# Patient Record
Sex: FEMALE | Race: WHITE | NOT HISPANIC OR LATINO | Employment: UNEMPLOYED | RURAL
[De-identification: names, ages, dates, MRNs, and addresses within clinical notes are randomized per-mention and may not be internally consistent; named-entity substitution may affect disease eponyms.]

---

## 2018-10-27 LAB
EXTERNAL ABO GROUPING: NORMAL
EXTERNAL RH FACTOR: POSITIVE

## 2021-01-25 ENCOUNTER — INITIAL PRENATAL (OUTPATIENT)
Dept: OBSTETRICS AND GYNECOLOGY | Facility: CLINIC | Age: 26
End: 2021-01-25

## 2021-01-25 VITALS
BODY MASS INDEX: 31.55 KG/M2 | SYSTOLIC BLOOD PRESSURE: 114 MMHG | DIASTOLIC BLOOD PRESSURE: 72 MMHG | HEIGHT: 69 IN | WEIGHT: 213 LBS

## 2021-01-25 DIAGNOSIS — Z36.89 ENCOUNTER FOR FETAL ANATOMIC SURVEY: ICD-10-CM

## 2021-01-25 DIAGNOSIS — O09.32 LATE PRENATAL CARE AFFECTING PREGNANCY IN SECOND TRIMESTER: ICD-10-CM

## 2021-01-25 DIAGNOSIS — O09.32 INITIAL OBSTETRIC VISIT IN SECOND TRIMESTER: Primary | ICD-10-CM

## 2021-01-25 DIAGNOSIS — Z3A.17 17 WEEKS GESTATION OF PREGNANCY: ICD-10-CM

## 2021-01-25 PROCEDURE — 99203 OFFICE O/P NEW LOW 30 MIN: CPT | Performed by: NURSE PRACTITIONER

## 2021-01-25 RX ORDER — PRENATAL VIT NO.126/IRON/FOLIC 28MG-0.8MG
TABLET ORAL DAILY
COMMUNITY

## 2021-01-25 NOTE — PROGRESS NOTES
[facility]  Obstetrics Visit    CHIEF COMPLAINT:  New prenatal visit    HISTORY OF PRESENT ILLNESS:  Hannah Buchanan is a 25 y.o. y/o  at 17w3d by LMP (Patient's last menstrual period was 2020 (exact date).).  This was a planned pregnancy and the patient is supported by her spuse Ramiro. Pt is M[employer]on[employer] and plans to deliver at the hospital.  Denies nausea with vomiting.  Denies breast tenderness.  She denies any vaginal bleeding.  She has started taking a prenatal vitamin.    REVIEW OF SYSTEMS  Review of Systems   Constitutional: Negative for chills, fatigue, fever, unexpected weight gain and unexpected weight loss.   Respiratory: Negative for shortness of breath.    Cardiovascular: Negative for chest pain and palpitations.   Gastrointestinal: Negative for abdominal pain, constipation, diarrhea, nausea and vomiting.   Genitourinary: Negative for breast discharge, breast lump, breast pain, difficulty urinating, dysuria, frequency, urinary incontinence, vaginal bleeding, vaginal discharge and vaginal pain.   Skin: Negative for rash.   Neurological: Negative for weakness and headache.   Psychiatric/Behavioral: Negative for sleep disturbance, depressed mood and stress.       PRENATAL RISK FACTORS  Pregnancy Problems (from 20 to present)     Problem Noted Resolved    Late prenatal care affecting pregnancy in second trimester 2021 by Kathy Rios APRN No    Overview Signed 2021  4:31 PM by Kathy Rios APRN [employer]    Declined prenatal labs and pap smear.   Previous OB records requested from [facility].  Hx of high blood pressure in previous pregnancies.               DATING CRITERIA:  LMP (2020) -- FOSTER 2021  1TUS-declined    OBSTETRIC HISTORY:  OB History    Para Term  AB Living   5 3 3   1 3   SAB TAB Ectopic Molar Multiple Live Births   1                # Outcome Date GA Lbr Jose R/2nd Weight Sex Delivery Anes PTL Lv   5 Current             4 Term 10/28/18   2807 g (6 lb 3 oz) F Vag-Spont None     3 Term 09/23/16   2722 g (6 lb) F Vag-Spont None     2 Term 05/15/15   2410 g (5 lb 5 oz) F Vag-Spont None     1 SAB              GYN HISTORY:  Denies h/o sexually transmitted infections/pelvic inflammatory disease  Denies h/o abnormal pap smears  Last pap smear:  Per patient, normal a few years ago in [place]  Last Completed Pap Smear       Status Date      PAP SMEAR No completions recorded        Denies h/o gynecologic surgeries, including biopsies of the cervix    PAST MEDICAL HISTORY:  Past Medical History:   Diagnosis Date   • Hypertension      PAST SURGICAL HISTORY:  History reviewed. No pertinent surgical history.  FAMILY HISTORY:  History reviewed. No pertinent family history.  SOCIAL HISTORY:  Social History     Socioeconomic History   • Marital status:      Spouse name: Not on file   • Number of children: Not on file   • Years of education: Not on file   • Highest education level: Not on file   Tobacco Use   • Smoking status: Never Smoker   • Smokeless tobacco: Never Used   Substance and Sexual Activity   • Alcohol use: Never     Frequency: Never   • Drug use: Never   • Sexual activity: Yes     Partners: Male     GENETIC SCREENING:  Age >36 yo as of FOSTER: No  Thalassemia: No  NTD: No  CHD: No  Down Syndrome/MR/Fragile X/Autism: No  Ashkenazi Religion with Arvin-Sachs, [place], familial dysautonomia: No  Sickle cell disease or trait: No  Hemophilia: No  Muscular dystrophy: No  Cystic fibrosis: No  Canadian's chorea: No  Birth defects: No  Genetic/chromosomal disorders: No    INFECTION HISTORY:  TB exposure: No  HSV: No  Illness since LMP: No  Prior GBS infected child: No  STIs: No    ALLERGIES:  No Known Allergies    MEDICATIONS:  Prior to Admission medications    Medication Sig Start Date End Date Taking? Authorizing Provider   prenatal vitamin (prenatal, CLASSIC, vitamin) tablet Take  by mouth Daily.   Yes Provider, MD Maegan  "      PHYSICAL EXAM:   /72   Ht 175.3 cm (69\")   Wt 96.6 kg (213 lb)   LMP 2020 (Exact Date)   BMI 31.45 kg/m²   General: Alert, healthy, no distress, well nourished and well developed.  Neurologic: Alert, oriented to person, place, and time.  Gait normal.  Cranial nerves II-XII grossly intact.  HEENT: Normocephalic, atraumatic.    Teeth: Normal hygiene.  Neck: Supple, no adenopathy, thyroid normal size, non-tender, without nodularity, trachea midline.  Breasts: No masses, skin dimpling, skin retraction, nipple discharge, or asymmetry bilaterally.  Lungs: Normal respiratory effort.  Clear to auscultation bilaterally.  No wheezes, rhonci, or rales.  Heart: Regular rate and rhythm.  No murmer, rub or gallop.  Abdomen: Soft, non-tender, non-distended,no masses, no hepatosplenomegaly, no hernia.  Skin: No rash, no lesions.  Extremities: No cyanosis, clubbing or edema.  PELVIC EXAM: Deferred    Bedside ultrasound performed by myself which shows the findings below: single IUP, appeals to be in 2nd trimester. Fetal movement and cardiac activity visualized. FHR in the 140s via doppler.     IMPRESSION:  Hannah Buchanan is a 25 y.o.  at 17w3d for a new prenatal visit.    PLAN:  1.  IUP at 17w3d  - Options counseling performed and patient desires continuation of pregnancy to term   - Prenatal labs DECLINED; will obtain previous OB and delivery records.   - Genetic testing, including cystic fibrosis, was discussed and patient declines  - Continue prenatal vitamins  - Weight gain counseling performed.   - [place]rav[place] BMI 18.5-24.9: Recommend 25-35 lb   - Return to clinic in 4 weeks for Anatomy US with PNG and return prenatal visit  - Reviewed COVID-19 visitation policy  - Reviewed COVID-19 precautions     Diagnosis Plan   1. Initial obstetric visit in second trimester     2. Encounter for fetal anatomic survey  US Ob 14 + Weeks Single or First Gestation   3. 17 weeks gestation of pregnancy     4. Late prenatal " care affecting pregnancy in second trimester       Kathy Edwards, APRN  1/25/2021  16:40 CST

## 2021-02-15 ENCOUNTER — TELEPHONE (OUTPATIENT)
Dept: OBSTETRICS AND GYNECOLOGY | Facility: CLINIC | Age: 26
End: 2021-02-15

## 2021-02-15 NOTE — TELEPHONE ENCOUNTER
The  Group ultrasound is closed today. Spoke with patient at this time patient is aware that her ultrasound is cancelled for today stated who she spoke with about her ultrasound told her they would give her a call with new date and time for her scan. Let her know our office is closed as well due to weather and to give us a call once she gets the date for her scan and we will get her scheduled with it.

## 2021-03-03 ENCOUNTER — ROUTINE PRENATAL (OUTPATIENT)
Dept: OBSTETRICS AND GYNECOLOGY | Facility: CLINIC | Age: 26
End: 2021-03-03

## 2021-03-03 VITALS — WEIGHT: 222 LBS | DIASTOLIC BLOOD PRESSURE: 70 MMHG | BODY MASS INDEX: 32.78 KG/M2 | SYSTOLIC BLOOD PRESSURE: 120 MMHG

## 2021-03-03 DIAGNOSIS — Z3A.22 22 WEEKS GESTATION OF PREGNANCY: ICD-10-CM

## 2021-03-03 DIAGNOSIS — Z87.59 HISTORY OF GESTATIONAL HYPERTENSION: ICD-10-CM

## 2021-03-03 DIAGNOSIS — O09.32 LATE PRENATAL CARE AFFECTING PREGNANCY IN SECOND TRIMESTER: Primary | ICD-10-CM

## 2021-03-03 DIAGNOSIS — Z36.89 ENCOUNTER FOR OTHER SPECIFIED ANTENATAL SCREENING: ICD-10-CM

## 2021-03-03 DIAGNOSIS — O09.892 HISTORY OF PRETERM DELIVERY, CURRENTLY PREGNANT IN SECOND TRIMESTER: ICD-10-CM

## 2021-03-03 PROCEDURE — 99213 OFFICE O/P EST LOW 20 MIN: CPT | Performed by: NURSE PRACTITIONER

## 2021-03-03 NOTE — PROGRESS NOTES
"CC: Prenatal visit; hx reviewed, no changes.     Hannah Buchanan is a 25 y.o.  at 22w5d.  Doing well.  Denies dysuria, abnormal vaginal d/c, headaches, heartburn, constipation, cramping, LOF, or VB.  Reports good FM.    /70   Wt 101 kg (222 lb)   LMP 2020 (Exact Date)   BMI 32.78 kg/m²   SVE: NA  Reviewed previous labs from [place]; they are from 2018.     Anatomy u/s preliminary report reviewed. Posterior placenta w/o previa, with normal insertion of a 3VC. EFW- 1lb 5 oz. Size c/w gestational age. Suboptimal images of the abdomen situs, right and left hand and right outflow tract. All other anatomy seen and noted to appear normal at this time. CL- 4.71cm. Recommended f/u in 4 weeks for suboptimal views; pt declined at this time as \"most everything was seen and was normal\".       Fetal Heart Rate: 149    Pregnancy Problems (from 21 to present)     Problem Noted Resolved    History of gestational hypertension 3/3/2021 by Angela Sanchez APRN No    History of  delivery, currently pregnant in second trimester 3/3/2021 by Angela Sanchez APRN No    Overview Signed 3/3/2021  3:29 PM by Angela Sanchez APRN     Delivery at 36 weeks 2/2 hypertension. Declined P17.         Late prenatal care affecting pregnancy in second trimester 2021 by Kathy Rios APRN No    Overview Addendum 2021 11:11 AM by Kathy Rios APRN     John    Declined prenatal labs and pap smear.   Previous OB records requested from [facility].  Hx of high blood pressure in previous pregnancies.    Per records, Preeclampsia with first pregnancy, delivered at 36 weeks.  Third pregnancy: Cord avulsion. D&E in 2018.  Pt's blood type is O positive.                A/P: Hannah Buchanan is a 25 y.o.  at 22w5d.  - RTC in 6 weeks per pt request  - Reviewed COVID-19 visitation policy  - Reviewed COVID-19 precautions     Diagnosis Plan   1. Late prenatal care affecting pregnancy in second trimester     2. " Encounter for other specified  screening  CBC (No Diff)    Glucose, Post 50 Gm Glucola   3. History of gestational hypertension     4. 22 weeks gestation of pregnancy     5. History of  delivery, currently pregnant in second trimester       Angela Sanchez, DELIA  3/3/2021  16:00 CST

## 2021-03-04 DIAGNOSIS — Z36.89 ENCOUNTER FOR FETAL ANATOMIC SURVEY: ICD-10-CM

## 2021-03-10 ENCOUNTER — TELEPHONE (OUTPATIENT)
Dept: OBSTETRICS AND GYNECOLOGY | Facility: CLINIC | Age: 26
End: 2021-03-10

## 2021-03-10 NOTE — TELEPHONE ENCOUNTER
Pt c/o headache with blurry vision. States that she checked her BP at home and it was 123/67. She is not having any pain otherwise and has not taken any OTC meds to help with headache. Advised her to rest, take tylenol and increase fluid intake today. If headache is not responding after 2 doses of tylenol she was advised to go to the [facility] ER in [place].

## 2021-04-14 ENCOUNTER — LAB (OUTPATIENT)
Dept: LAB | Facility: HOSPITAL | Age: 26
End: 2021-04-14

## 2021-04-14 ENCOUNTER — ROUTINE PRENATAL (OUTPATIENT)
Dept: OBSTETRICS AND GYNECOLOGY | Facility: CLINIC | Age: 26
End: 2021-04-14

## 2021-04-14 VITALS — SYSTOLIC BLOOD PRESSURE: 114 MMHG | DIASTOLIC BLOOD PRESSURE: 74 MMHG | WEIGHT: 232 LBS | BODY MASS INDEX: 34.26 KG/M2

## 2021-04-14 DIAGNOSIS — O26.893 HEARTBURN DURING PREGNANCY IN THIRD TRIMESTER: ICD-10-CM

## 2021-04-14 DIAGNOSIS — O09.33 LATE PRENATAL CARE AFFECTING PREGNANCY IN THIRD TRIMESTER: ICD-10-CM

## 2021-04-14 DIAGNOSIS — Z87.59 HISTORY OF GESTATIONAL HYPERTENSION: ICD-10-CM

## 2021-04-14 DIAGNOSIS — Z3A.28 28 WEEKS GESTATION OF PREGNANCY: ICD-10-CM

## 2021-04-14 DIAGNOSIS — Z36.89 ENCOUNTER FOR OTHER SPECIFIED ANTENATAL SCREENING: ICD-10-CM

## 2021-04-14 DIAGNOSIS — O09.893 HISTORY OF PRETERM DELIVERY, CURRENTLY PREGNANT IN THIRD TRIMESTER: Primary | ICD-10-CM

## 2021-04-14 DIAGNOSIS — O09.93 HIGH-RISK PREGNANCY IN THIRD TRIMESTER: ICD-10-CM

## 2021-04-14 DIAGNOSIS — R12 HEARTBURN DURING PREGNANCY IN THIRD TRIMESTER: ICD-10-CM

## 2021-04-14 LAB
DEPRECATED RDW RBC AUTO: 41.6 FL (ref 37–54)
ERYTHROCYTE [DISTWIDTH] IN BLOOD BY AUTOMATED COUNT: 13.1 % (ref 12.3–15.4)
GLUCOSE 1H P 100 G GLC PO SERPL-MCNC: 125 MG/DL (ref 60–140)
HCT VFR BLD AUTO: 37.2 % (ref 34–46.6)
HGB BLD-MCNC: 13.4 G/DL (ref 12–15.9)
MCH RBC QN AUTO: 31.7 PG (ref 26.6–33)
MCHC RBC AUTO-ENTMCNC: 36 G/DL (ref 31.5–35.7)
MCV RBC AUTO: 87.9 FL (ref 79–97)
PLATELET # BLD AUTO: 165 10*3/MM3 (ref 140–450)
PMV BLD AUTO: 11.3 FL (ref 6–12)
RBC # BLD AUTO: 4.23 10*6/MM3 (ref 3.77–5.28)
WBC # BLD AUTO: 9.82 10*3/MM3 (ref 3.4–10.8)

## 2021-04-14 PROCEDURE — 82950 GLUCOSE TEST: CPT

## 2021-04-14 PROCEDURE — 85027 COMPLETE CBC AUTOMATED: CPT

## 2021-04-14 PROCEDURE — 99213 OFFICE O/P EST LOW 20 MIN: CPT | Performed by: NURSE PRACTITIONER

## 2021-04-14 NOTE — PROGRESS NOTES
CC: Prenatal visit; hx reviewed, no changes.     Hannah Buchanan is a 25 y.o.  at 28w5d.  Doing well.  Denies N/V, dysuria, abnormal vaginal d/c, headaches, constipation, cramping, regular contractions, LOF, or VB.  Reports good FM. Having some heartburn but hasn't needed to take anything yet.    /74   Wt 105 kg (232 lb)   LMP 2020 (Exact Date)   BMI 34.26 kg/m²   SVE: NA  Declined Tdap today.  Fundal Height (cm): 28 cm  Fetal Heart Rate: 145    Pregnancy Problems (from 21 to present)     Problem Noted Resolved    High-risk pregnancy in third trimester 2021 by Angela Sanchez APRN No    Overview Signed 2021 10:15 AM by Angela Sanchez APRN     O pos/ Rubella NONimmune (Pt does not vaccinate) / GBS 36 weeks  Dating: LMP  Genetics: Declined  Tdap: Declined   Flu: Declined  Anatomy: WNL with a few subopts. Pt declined F/U for subopts.  1h Glucola: -   H&H/Plts:   No results found for: HGB, HCT, PLT  Breast/BC?         Heartburn during pregnancy in third trimester 2021 by Angela Sanchez APRN No    History of gestational hypertension 3/3/2021 by Angela Sanchez APRN No    History of  delivery, currently pregnant in third trimester 3/3/2021 by Angela Sanchez APRN No    Overview Signed 3/3/2021  3:29 PM by Angela Sanchez APRN     Delivery at 36 weeks 2/2 hypertension. Declined P17.         Late prenatal care affecting pregnancy in third trimester 2021 by Kathy Rios APRN No    Overview Addendum 2021 11:11 AM by Kathy Rios APRN [employer]    Declined prenatal labs and pap smear.   Previous OB records requested from [facility].  Hx of high blood pressure in previous pregnancies.    Per records, Preeclampsia with first pregnancy, delivered at 36 weeks.  Third pregnancy: Cord avulsion. D&E in 2018.  Pt's blood type is O positive.          Previous Version          A/P: Hannah Buchanan is a 25 y.o.  at 28w5d.  - RTC in 4 weeks  -  Reviewed COVID-19 visitation policy  - Reviewed COVID-19 precautions     Diagnosis Plan   1. History of  delivery, currently pregnant in third trimester     2. History of gestational hypertension  PreE s/s reviewed.   3. Late prenatal care affecting pregnancy in third trimester     4. High-risk pregnancy in third trimester     5. 28 weeks gestation of pregnancy     6. Heartburn during pregnancy in third trimester  Discussed trigger foods and OTC management options     Angela Sanchez, DELIA  2021  10:15 CDT

## 2021-05-12 ENCOUNTER — ROUTINE PRENATAL (OUTPATIENT)
Dept: OBSTETRICS AND GYNECOLOGY | Facility: CLINIC | Age: 26
End: 2021-05-12

## 2021-05-12 VITALS — WEIGHT: 238 LBS | BODY MASS INDEX: 35.15 KG/M2 | DIASTOLIC BLOOD PRESSURE: 72 MMHG | SYSTOLIC BLOOD PRESSURE: 118 MMHG

## 2021-05-12 DIAGNOSIS — O09.33 LATE PRENATAL CARE AFFECTING PREGNANCY IN THIRD TRIMESTER: ICD-10-CM

## 2021-05-12 DIAGNOSIS — O09.93 HIGH-RISK PREGNANCY IN THIRD TRIMESTER: Primary | ICD-10-CM

## 2021-05-12 DIAGNOSIS — Z87.59 HISTORY OF GESTATIONAL HYPERTENSION: ICD-10-CM

## 2021-05-12 DIAGNOSIS — R12 HEARTBURN DURING PREGNANCY IN THIRD TRIMESTER: ICD-10-CM

## 2021-05-12 DIAGNOSIS — O26.893 HEARTBURN DURING PREGNANCY IN THIRD TRIMESTER: ICD-10-CM

## 2021-05-12 DIAGNOSIS — O09.893 HISTORY OF PRETERM DELIVERY, CURRENTLY PREGNANT IN THIRD TRIMESTER: ICD-10-CM

## 2021-05-12 DIAGNOSIS — Z3A.32 32 WEEKS GESTATION OF PREGNANCY: ICD-10-CM

## 2021-05-12 PROCEDURE — 99213 OFFICE O/P EST LOW 20 MIN: CPT | Performed by: NURSE PRACTITIONER

## 2021-05-27 ENCOUNTER — ROUTINE PRENATAL (OUTPATIENT)
Dept: OBSTETRICS AND GYNECOLOGY | Facility: CLINIC | Age: 26
End: 2021-05-27

## 2021-05-27 VITALS — DIASTOLIC BLOOD PRESSURE: 68 MMHG | WEIGHT: 241 LBS | SYSTOLIC BLOOD PRESSURE: 114 MMHG | BODY MASS INDEX: 35.59 KG/M2

## 2021-05-27 DIAGNOSIS — O09.33 LATE PRENATAL CARE AFFECTING PREGNANCY IN THIRD TRIMESTER: ICD-10-CM

## 2021-05-27 DIAGNOSIS — Z3A.35 35 WEEKS GESTATION OF PREGNANCY: Primary | ICD-10-CM

## 2021-05-27 DIAGNOSIS — O09.93 HIGH-RISK PREGNANCY IN THIRD TRIMESTER: ICD-10-CM

## 2021-05-27 PROCEDURE — 99213 OFFICE O/P EST LOW 20 MIN: CPT | Performed by: NURSE PRACTITIONER

## 2021-05-27 NOTE — PROGRESS NOTES
CC: Prenatal visit    Hannah Buchanan is a 26 y.o.  at 34w6d.  Doing well.  Denies contractions, LOF, or VB.  Reports good FM. Feeling some pelvic pressure. Pt desires to keep appointments in [facility]; is okay she may not meet a physician before delivery. Discussed that [facility] is only available twice a month in [facility].     /68   Wt 109 kg (241 lb)   LMP 2020 (Exact Date)   BMI 35.59 kg/m²   SVE: Deferred  Fundal Height (cm): 35 cm  Fetal Heart Rate: 160   Vertex via Vscan    Pregnancy Problems (from 21 to present)     Problem Noted Resolved    High-risk pregnancy in third trimester 2021 by Angela Sanchez APRN No    Overview Signed 2021 10:15 AM by Angela Sanchez APRN     O pos/ Rubella NONimmune (Pt does not vaccinate) / GBS 36 weeks  Dating: LMP  Genetics: Declined  Tdap: Declined   Flu: Declined  Anatomy: WNL with a few subopts. Pt declined F/U for subopts.  1h Glucola: -   H&H/Plts:   No results found for: HGB, HCT, PLT  Breast/BC?         Heartburn during pregnancy in third trimester 2021 by Angela Sanchez APRN No    History of gestational hypertension 3/3/2021 by Angela Sanchez APRN No    History of  delivery, currently pregnant in third trimester 3/3/2021 by Angela Sanchez APRN No    Overview Signed 3/3/2021  3:29 PM by Angela Sanchez APRN     Delivery at 36 weeks 2/2 hypertension. Declined P17.         Late prenatal care affecting pregnancy in third trimester 2021 by Kathy Rios APRN No    Overview Addendum 2021 11:11 AM by Kathy Rios APRN     [place]    Declined prenatal labs and pap smear.   Previous OB records requested from [facility].  Hx of high blood pressure in previous pregnancies.    Per records, Preeclampsia with first pregnancy, delivered at 36 weeks.  Third pregnancy: Cord avulsion. D&E in 2018.  Pt's blood type is O positive.          Previous Version          A/P: Hannah Buchanan is a 26 y.o.   at 34w6d.  -  labor  Precautions and fetal kick count reviewed.  - GBS swab at next appointment; counseling provided.  - RTC in 1 weeks  - Reviewed COVID-19 visitation policy  - Reviewed COVID-19 precautions     Diagnosis Plan   1. 35 weeks gestation of pregnancy     2. Late prenatal care affecting pregnancy in third trimester     3. High-risk pregnancy in third trimester       Kathy Edwards, APRN  2021  11:44 CDT

## 2021-06-09 ENCOUNTER — ROUTINE PRENATAL (OUTPATIENT)
Dept: OBSTETRICS AND GYNECOLOGY | Facility: CLINIC | Age: 26
End: 2021-06-09

## 2021-06-09 VITALS — DIASTOLIC BLOOD PRESSURE: 70 MMHG | BODY MASS INDEX: 35.44 KG/M2 | WEIGHT: 240 LBS | SYSTOLIC BLOOD PRESSURE: 118 MMHG

## 2021-06-09 DIAGNOSIS — O09.893 HISTORY OF PRETERM DELIVERY, CURRENTLY PREGNANT IN THIRD TRIMESTER: ICD-10-CM

## 2021-06-09 DIAGNOSIS — Z3A.36 36 WEEKS GESTATION OF PREGNANCY: ICD-10-CM

## 2021-06-09 DIAGNOSIS — O09.33 LATE PRENATAL CARE AFFECTING PREGNANCY IN THIRD TRIMESTER: ICD-10-CM

## 2021-06-09 DIAGNOSIS — Z87.59 HISTORY OF GESTATIONAL HYPERTENSION: ICD-10-CM

## 2021-06-09 DIAGNOSIS — O09.93 HIGH-RISK PREGNANCY IN THIRD TRIMESTER: ICD-10-CM

## 2021-06-09 DIAGNOSIS — Z36.85 ENCOUNTER FOR ANTENATAL SCREENING FOR STREPTOCOCCUS B: Primary | ICD-10-CM

## 2021-06-09 PROCEDURE — 99213 OFFICE O/P EST LOW 20 MIN: CPT | Performed by: NURSE PRACTITIONER

## 2021-06-09 PROCEDURE — 87653 STREP B DNA AMP PROBE: CPT | Performed by: NURSE PRACTITIONER

## 2021-06-09 NOTE — PROGRESS NOTES
CC: Prenatal visit    Hannah Buchanan is a 26 y.o.  at 36w5d.  Doing well.  Denies contractions, LOF, or VB.  Reports good FM.    /70   Wt 109 kg (240 lb)   LMP 2020 (Exact Date)   BMI 35.44 kg/m²   SVE: cervix closed/thick/high; GBS obtained  Fundal Height (cm): 37 cm  Fetal Heart Rate: 130    Pregnancy Problems (from 20 to present)     Problem Noted Resolved    High-risk pregnancy in third trimester 2021 by Angela Sanchez APRN No    Overview Signed 2021 10:15 AM by Angela Sanchez APRN     O pos/ Rubella NONimmune (Pt does not vaccinate) / GBS 36 weeks  Dating: LMP  Genetics: Declined  Tdap: Declined   Flu: Declined  Anatomy: WNL with a few subopts. Pt declined F/U for subopts.  1h Glucola: -   H&H/Plts:   No results found for: HGB, HCT, PLT  Breast/BC?         Heartburn during pregnancy in third trimester 2021 by Angela Sanchez APRN No    History of gestational hypertension 3/3/2021 by Angela Sanchez APRN No    History of  delivery, currently pregnant in third trimester 3/3/2021 by Angela Sanchez APRN No    Overview Signed 3/3/2021  3:29 PM by Angela Sanchez APRN     Delivery at 36 weeks 2/2 hypertension. Declined P17.         Late prenatal care affecting pregnancy in third trimester 2021 by Kathy Rios APRN No    Overview Addendum 2021 11:11 AM by Kathy Rios APRN     John    Declined prenatal labs and pap smear.   Previous OB records requested from [facility].  Hx of high blood pressure in previous pregnancies.    Per records, Preeclampsia with first pregnancy, delivered at 36 weeks.  Third pregnancy: Cord avulsion. D&E in 2018.  Pt's blood type is O positive.          Previous Version          A/P: Hannah Buchanan is a 26 y.o.  at 36w5d.  - RTC in 1 week  - Reviewed COVID-19 visitation policy  - Reviewed COVID-19 precautions     Diagnosis Plan   1. Encounter for  screening for Streptococcus B  Group B Strep  (Molecular) - Swab, Vaginal/Rectum    Group B Strep (Molecular) - Swab, Vaginal/Rectum   2. History of  delivery, currently pregnant in third trimester     3. History of gestational hypertension  Blood pressure 118/70 today, no signs/symptoms of preeclampsia reported today   4. Late prenatal care affecting pregnancy in third trimester     5. 36 weeks gestation of pregnancy     6. High-risk pregnancy in third trimester       Angela Sanchez, DELIA  2021  11:57 CDT

## 2021-06-10 PROBLEM — B95.1 POSITIVE GBS TEST: Status: ACTIVE | Noted: 2021-06-10

## 2021-06-10 LAB — GROUP B STREP, DNA: POSITIVE

## 2021-06-30 ENCOUNTER — ROUTINE PRENATAL (OUTPATIENT)
Dept: OBSTETRICS AND GYNECOLOGY | Facility: CLINIC | Age: 26
End: 2021-06-30

## 2021-06-30 VITALS — DIASTOLIC BLOOD PRESSURE: 78 MMHG | SYSTOLIC BLOOD PRESSURE: 120 MMHG | WEIGHT: 244 LBS | BODY MASS INDEX: 36.03 KG/M2

## 2021-06-30 DIAGNOSIS — O09.33 LATE PRENATAL CARE AFFECTING PREGNANCY IN THIRD TRIMESTER: ICD-10-CM

## 2021-06-30 DIAGNOSIS — O09.893 HISTORY OF PRETERM DELIVERY, CURRENTLY PREGNANT IN THIRD TRIMESTER: ICD-10-CM

## 2021-06-30 DIAGNOSIS — B95.1 POSITIVE GBS TEST: Primary | ICD-10-CM

## 2021-06-30 DIAGNOSIS — R12 HEARTBURN DURING PREGNANCY IN THIRD TRIMESTER: ICD-10-CM

## 2021-06-30 DIAGNOSIS — O09.93 HIGH-RISK PREGNANCY IN THIRD TRIMESTER: ICD-10-CM

## 2021-06-30 DIAGNOSIS — Z87.59 HISTORY OF GESTATIONAL HYPERTENSION: ICD-10-CM

## 2021-06-30 DIAGNOSIS — Z3A.39 39 WEEKS GESTATION OF PREGNANCY: ICD-10-CM

## 2021-06-30 DIAGNOSIS — O26.893 HEARTBURN DURING PREGNANCY IN THIRD TRIMESTER: ICD-10-CM

## 2021-06-30 PROCEDURE — 99212 OFFICE O/P EST SF 10 MIN: CPT | Performed by: NURSE PRACTITIONER

## 2021-06-30 NOTE — PROGRESS NOTES
CC: Prenatal visit; hx reviewed, no changes.     Hannah Buchanan is a 26 y.o.  at 39w5d.  Doing well.  Denies dysuria, abnormal vaginal d/c, headaches, heartburn, constipation, cramping, regular contractions, LOF, or VB.  Reports good FM.    /78   Wt 111 kg (244 lb)   LMP 2020 (Exact Date)   BMI 36.03 kg/m²   SVE: 1-2/40/-3, soft and stretchy, mid-position  Fundal Height (cm): 39 cm  Fetal Heart Rate: 147    Pregnancy Problems (from 21 to present)     Problem Noted Resolved    Positive GBS test 6/10/2021 by Angela Sanchez APRN No    Overview Signed 6/10/2021 10:58 AM by Angela Sanchez APRN     Abx in L&D         High-risk pregnancy in third trimester 2021 by Angela Sanchez APRN No    Overview Signed 2021 10:15 AM by Angela Sanchez APRN     O pos/ Rubella NONimmune (Pt does not vaccinate) / GBS 36 weeks  Dating: LMP  Genetics: Declined  Tdap: Declined   Flu: Declined  Anatomy: WNL with a few subopts. Pt declined F/U for subopts.  1h Glucola: -   H&H/Plts:   No results found for: HGB, HCT, PLT  Breast/BC?         Heartburn during pregnancy in third trimester 2021 by Angela Sanchez APRN No    History of gestational hypertension 3/3/2021 by Angela Sanchze APRN No    History of  delivery, currently pregnant in third trimester 3/3/2021 by Angela Sanchez APRN No    Overview Signed 3/3/2021  3:29 PM by Angela Sanchez APRN     Delivery at 36 weeks 2/2 hypertension. Declined P17.         Late prenatal care affecting pregnancy in third trimester 2021 by Kathy Rios APRN No    Overview Addendum 2021 11:11 AM by Kathy Rios APRN     [place]    Declined prenatal labs and pap smear.   Previous OB records requested from [facility].  Hx of high blood pressure in previous pregnancies.    Per records, Preeclampsia with first pregnancy, delivered at 36 weeks.  Third pregnancy: Cord avulsion. D&E in 2018.  Pt's blood type is O positive.           Previous Version          A/P: Hannah Buchanan is a 26 y.o.  at 39w5d.  - RTC in 1 week  - Reviewed COVID-19 visitation policy  - Reviewed COVID-19 precautions     Diagnosis Plan   1. Positive GBS test     2. High-risk pregnancy in third trimester     3. Heartburn during pregnancy in third trimester     4. History of  delivery, currently pregnant in third trimester     5. History of gestational hypertension     6. Late prenatal care affecting pregnancy in third trimester     7. 39 weeks gestation of pregnancy       Angela Sanchez, APRN  2021  11:35 CDT

## 2021-07-02 ENCOUNTER — HOSPITAL ENCOUNTER (INPATIENT)
Facility: HOSPITAL | Age: 26
LOS: 1 days | Discharge: HOME | End: 2021-07-03
Attending: OBSTETRICS & GYNECOLOGY | Admitting: OBSTETRICS & GYNECOLOGY

## 2021-07-02 ENCOUNTER — ANESTHESIA (OUTPATIENT)
Dept: LABOR AND DELIVERY | Facility: HOSPITAL | Age: 26
End: 2021-07-02

## 2021-07-02 ENCOUNTER — ANESTHESIA EVENT (OUTPATIENT)
Dept: LABOR AND DELIVERY | Facility: HOSPITAL | Age: 26
End: 2021-07-02

## 2021-07-02 ENCOUNTER — NURSE TRIAGE (OUTPATIENT)
Dept: CALL CENTER | Facility: HOSPITAL | Age: 26
End: 2021-07-02

## 2021-07-02 PROBLEM — Z37.9 NORMAL LABOR: Status: ACTIVE | Noted: 2021-07-02

## 2021-07-02 LAB
ABO GROUP BLD: NORMAL
ABO GROUP BLD: NORMAL
AMPHET+METHAMPHET UR QL: NEGATIVE
AMPHETAMINES UR QL: NEGATIVE
BARBITURATES UR QL SCN: NEGATIVE
BENZODIAZ UR QL SCN: NEGATIVE
BLD GP AB SCN SERPL QL: NEGATIVE
BUPRENORPHINE SERPL-MCNC: NEGATIVE NG/ML
CANNABINOIDS SERPL QL: NEGATIVE
COCAINE UR QL: NEGATIVE
DEPRECATED RDW RBC AUTO: 43.2 FL (ref 37–54)
ERYTHROCYTE [DISTWIDTH] IN BLOOD BY AUTOMATED COUNT: 14.2 % (ref 12.3–15.4)
HCT VFR BLD AUTO: 42.5 % (ref 34–46.6)
HGB BLD-MCNC: 14.5 G/DL (ref 12–15.9)
HOLD SPECIMEN: NORMAL
HOLD SPECIMEN: NORMAL
Lab: NORMAL
MCH RBC QN AUTO: 28.9 PG (ref 26.6–33)
MCHC RBC AUTO-ENTMCNC: 34.1 G/DL (ref 31.5–35.7)
MCV RBC AUTO: 84.7 FL (ref 79–97)
METHADONE UR QL SCN: NEGATIVE
OPIATES UR QL: NEGATIVE
OXYCODONE UR QL SCN: NEGATIVE
PCP UR QL SCN: NEGATIVE
PLATELET # BLD AUTO: 179 10*3/MM3 (ref 140–450)
PMV BLD AUTO: 11.4 FL (ref 6–12)
PROPOXYPH UR QL: NEGATIVE
RBC # BLD AUTO: 5.02 10*6/MM3 (ref 3.77–5.28)
RH BLD: POSITIVE
RH BLD: POSITIVE
T&S EXPIRATION DATE: NORMAL
TRICYCLICS UR QL SCN: NEGATIVE
WBC # BLD AUTO: 13.64 10*3/MM3 (ref 3.4–10.8)

## 2021-07-02 PROCEDURE — 86901 BLOOD TYPING SEROLOGIC RH(D): CPT

## 2021-07-02 PROCEDURE — 25010000003 PENICILLIN G POTASSIUM PER 600000 UNITS: Performed by: OBSTETRICS & GYNECOLOGY

## 2021-07-02 PROCEDURE — 25010000002 BUTORPHANOL PER 1 MG: Performed by: OBSTETRICS & GYNECOLOGY

## 2021-07-02 PROCEDURE — C1755 CATHETER, INTRASPINAL: HCPCS | Performed by: NURSE ANESTHETIST, CERTIFIED REGISTERED

## 2021-07-02 PROCEDURE — 86900 BLOOD TYPING SEROLOGIC ABO: CPT

## 2021-07-02 PROCEDURE — 86901 BLOOD TYPING SEROLOGIC RH(D): CPT | Performed by: OBSTETRICS & GYNECOLOGY

## 2021-07-02 PROCEDURE — 59409 OBSTETRICAL CARE: CPT | Performed by: OBSTETRICS & GYNECOLOGY

## 2021-07-02 PROCEDURE — 86900 BLOOD TYPING SEROLOGIC ABO: CPT | Performed by: OBSTETRICS & GYNECOLOGY

## 2021-07-02 PROCEDURE — 86850 RBC ANTIBODY SCREEN: CPT | Performed by: OBSTETRICS & GYNECOLOGY

## 2021-07-02 PROCEDURE — 25010000002 FENTANYL CITRATE (PF) 100 MCG/2ML SOLUTION: Performed by: NURSE ANESTHETIST, CERTIFIED REGISTERED

## 2021-07-02 PROCEDURE — 85027 COMPLETE CBC AUTOMATED: CPT | Performed by: OBSTETRICS & GYNECOLOGY

## 2021-07-02 PROCEDURE — 80306 DRUG TEST PRSMV INSTRMNT: CPT | Performed by: OBSTETRICS & GYNECOLOGY

## 2021-07-02 RX ORDER — PROMETHAZINE HYDROCHLORIDE 12.5 MG/1
12.5 SUPPOSITORY RECTAL EVERY 6 HOURS PRN
Status: DISCONTINUED | OUTPATIENT
Start: 2021-07-02 | End: 2021-07-03 | Stop reason: HOSPADM

## 2021-07-02 RX ORDER — BUTORPHANOL TARTRATE 1 MG/ML
1 INJECTION, SOLUTION INTRAMUSCULAR; INTRAVENOUS
Status: DISCONTINUED | OUTPATIENT
Start: 2021-07-02 | End: 2021-07-03

## 2021-07-02 RX ORDER — OXYTOCIN/0.9 % SODIUM CHLORIDE 30/500 ML
650 PLASTIC BAG, INJECTION (ML) INTRAVENOUS ONCE
Status: DISCONTINUED | OUTPATIENT
Start: 2021-07-02 | End: 2021-07-03 | Stop reason: SDUPTHER

## 2021-07-02 RX ORDER — METHYLERGONOVINE MALEATE 0.2 MG/ML
200 INJECTION INTRAVENOUS ONCE AS NEEDED
Status: DISCONTINUED | OUTPATIENT
Start: 2021-07-02 | End: 2021-07-03 | Stop reason: HOSPADM

## 2021-07-02 RX ORDER — CARBOPROST TROMETHAMINE 250 UG/ML
250 INJECTION, SOLUTION INTRAMUSCULAR AS NEEDED
Status: DISCONTINUED | OUTPATIENT
Start: 2021-07-02 | End: 2021-07-03 | Stop reason: SDUPTHER

## 2021-07-02 RX ORDER — MISOPROSTOL 200 UG/1
800 TABLET ORAL AS NEEDED
Status: DISCONTINUED | OUTPATIENT
Start: 2021-07-02 | End: 2021-07-03 | Stop reason: HOSPADM

## 2021-07-02 RX ORDER — LIDOCAINE HYDROCHLORIDE 10 MG/ML
5 INJECTION, SOLUTION EPIDURAL; INFILTRATION; INTRACAUDAL; PERINEURAL AS NEEDED
Status: DISCONTINUED | OUTPATIENT
Start: 2021-07-02 | End: 2021-07-03 | Stop reason: HOSPADM

## 2021-07-02 RX ORDER — SODIUM CHLORIDE 0.9 % (FLUSH) 0.9 %
3 SYRINGE (ML) INJECTION EVERY 12 HOURS SCHEDULED
Status: DISCONTINUED | OUTPATIENT
Start: 2021-07-02 | End: 2021-07-03 | Stop reason: HOSPADM

## 2021-07-02 RX ORDER — SODIUM CHLORIDE 0.9 % (FLUSH) 0.9 %
3-10 SYRINGE (ML) INJECTION AS NEEDED
Status: DISCONTINUED | OUTPATIENT
Start: 2021-07-02 | End: 2021-07-03 | Stop reason: HOSPADM

## 2021-07-02 RX ORDER — SODIUM CHLORIDE, SODIUM LACTATE, POTASSIUM CHLORIDE, CALCIUM CHLORIDE 600; 310; 30; 20 MG/100ML; MG/100ML; MG/100ML; MG/100ML
INJECTION, SOLUTION INTRAVENOUS
Status: COMPLETED
Start: 2021-07-02 | End: 2021-07-02

## 2021-07-02 RX ORDER — OXYTOCIN/0.9 % SODIUM CHLORIDE 30/500 ML
85 PLASTIC BAG, INJECTION (ML) INTRAVENOUS ONCE
Status: DISCONTINUED | OUTPATIENT
Start: 2021-07-02 | End: 2021-07-03 | Stop reason: SDUPTHER

## 2021-07-02 RX ORDER — OXYTOCIN/0.9 % SODIUM CHLORIDE 30/500 ML
2-20 PLASTIC BAG, INJECTION (ML) INTRAVENOUS
Status: DISCONTINUED | OUTPATIENT
Start: 2021-07-02 | End: 2021-07-03 | Stop reason: HOSPADM

## 2021-07-02 RX ORDER — SODIUM CHLORIDE, SODIUM LACTATE, POTASSIUM CHLORIDE, CALCIUM CHLORIDE 600; 310; 30; 20 MG/100ML; MG/100ML; MG/100ML; MG/100ML
125 INJECTION, SOLUTION INTRAVENOUS CONTINUOUS
Status: DISCONTINUED | OUTPATIENT
Start: 2021-07-02 | End: 2021-07-03 | Stop reason: HOSPADM

## 2021-07-02 RX ORDER — DEXTROSE, SODIUM CHLORIDE, SODIUM LACTATE, POTASSIUM CHLORIDE, AND CALCIUM CHLORIDE 5; .6; .31; .03; .02 G/100ML; G/100ML; G/100ML; G/100ML; G/100ML
125 INJECTION, SOLUTION INTRAVENOUS CONTINUOUS
Status: DISCONTINUED | OUTPATIENT
Start: 2021-07-02 | End: 2021-07-02

## 2021-07-02 RX ORDER — BUPIVACAINE HYDROCHLORIDE 2.5 MG/ML
INJECTION, SOLUTION EPIDURAL; INFILTRATION; INTRACAUDAL AS NEEDED
Status: DISCONTINUED | OUTPATIENT
Start: 2021-07-02 | End: 2021-07-21 | Stop reason: SURG

## 2021-07-02 RX ORDER — PROMETHAZINE HYDROCHLORIDE 12.5 MG/1
12.5 TABLET ORAL EVERY 6 HOURS PRN
Status: DISCONTINUED | OUTPATIENT
Start: 2021-07-02 | End: 2021-07-03 | Stop reason: HOSPADM

## 2021-07-02 RX ORDER — FENTANYL CITRATE 50 UG/ML
INJECTION, SOLUTION INTRAMUSCULAR; INTRAVENOUS AS NEEDED
Status: DISCONTINUED | OUTPATIENT
Start: 2021-07-02 | End: 2021-07-21 | Stop reason: SURG

## 2021-07-02 RX ADMIN — BUTORPHANOL TARTRATE 2 MG: 2 INJECTION, SOLUTION INTRAMUSCULAR; INTRAVENOUS at 15:09

## 2021-07-02 RX ADMIN — BUPIVACAINE HYDROCHLORIDE 8 ML: 2.5 INJECTION, SOLUTION EPIDURAL; INFILTRATION; INTRACAUDAL; PERINEURAL at 19:01

## 2021-07-02 RX ADMIN — Medication 10 ML/HR: at 19:10

## 2021-07-02 RX ADMIN — SODIUM CHLORIDE 3 MILLION UNITS: 9 INJECTION, SOLUTION INTRAVENOUS at 21:45

## 2021-07-02 RX ADMIN — SODIUM CHLORIDE 3 MILLION UNITS: 9 INJECTION, SOLUTION INTRAVENOUS at 17:44

## 2021-07-02 RX ADMIN — SODIUM CHLORIDE, POTASSIUM CHLORIDE, SODIUM LACTATE AND CALCIUM CHLORIDE 125 ML/HR: 600; 310; 30; 20 INJECTION, SOLUTION INTRAVENOUS at 18:56

## 2021-07-02 RX ADMIN — SODIUM CHLORIDE 5 MILLION UNITS: 9 INJECTION, SOLUTION INTRAVENOUS at 13:19

## 2021-07-02 RX ADMIN — OXYTOCIN-SODIUM CHLORIDE 0.9% IV SOLN 30 UNIT/500ML 2 MILLI-UNITS/MIN: 30-0.9/5 SOLUTION at 16:49

## 2021-07-02 RX ADMIN — FENTANYL CITRATE 100 MCG: 50 INJECTION INTRAMUSCULAR; INTRAVENOUS at 19:01

## 2021-07-02 RX ADMIN — SODIUM CHLORIDE, POTASSIUM CHLORIDE, SODIUM LACTATE AND CALCIUM CHLORIDE 1000 ML: 600; 310; 30; 20 INJECTION, SOLUTION INTRAVENOUS at 13:08

## 2021-07-02 NOTE — H&P
[facility]  Obstetric History and Physical    Chief complaint contractions        Patient is a 26 y.o. female  currently at 40w0d, who presents with regular uterine contractions from early this morning she is 5 cm denies associated rupture membranes having a small amount of bleeding consistent with show denies any large amount of bleeding baby active.    Her prenatal care is has been through [facility] does have history of  labor     Obstetric History   # 1 - Date: None, Sex: None, Weight: None, GA: None, Delivery: None, Apgar1: None, Apgar5: None, Living: None, Birth Comments: None    # 2 - Date: 05/15/15, Sex: Female, Weight: 2410 g (5 lb 5 oz), GA: 36w0d, Delivery: Vaginal, Spontaneous, Apgar1: None, Apgar5: None, Living: Living, Birth Comments: None    # 3 - Date: 16, Sex: Female, Weight: 3062 g (6 lb 12 oz), GA: 40w3d, Delivery: Vaginal, Spontaneous, Apgar1: None, Apgar5: None, Living: Living, Birth Comments: None    # 4 - Date: 10/28/18, Sex: Female, Weight: 2807 g (6 lb 3 oz), GA: 40w1d, Delivery: Vaginal, Spontaneous, Apgar1: None, Apgar5: None, Living: Living, Birth Comments: Retained placenta manual extraction of placenta in OR     # 5 - Date: None, Sex: None, Weight: None, GA: None, Delivery: None, Apgar1: None, Apgar5: None, Living: None, Birth Comments: None       The following portions of the patients history were reviewed and updated as appropriate: current medications, allergies, past medical history, past surgical history, past family history, past social history and problem list .       Prenatal Information:  Prenatal Results     POC Urine Glucose/Protein     Test Value Reference Range Date Time    Urine Glucose        Urine Protein              Initial Prenatal Labs     Test Value Reference Range Date Time    Hemoglobin        Hematocrit        Platelets  165 10*3/mm3 140 - 450 21 1034    Rubella IgG        Hepatitis B SAg        Hepatitis C Ab         RPR        ABO ^ O   10/27/18     Rh ^ Positive   10/27/18     Antibody Screen        HIV        Urine Culture        Gonorrhea        Chlamydia        TSH              2nd and 3rd Trimester     Test Value Reference Range Date Time    Hemoglobin (repeated)  13.4 g/dL 12.0 - 15.9 04/14/21 1034    Hematocrit (repeated)  37.2 % 34.0 - 46.6 04/14/21 1034    GCT  125 mg/dL 60 - 140 04/14/21 1034    Antibody Screen (repeated)        GTT Fasting        GTT 1 Hr        GTT 2 Hr        GTT 3 Hr        Group B Strep  Positive  Negative 06/09/21 1033          Drug Screening     Test Value Reference Range Date Time    Amphetamine Screen        Barbiturate Screen        Benzodiazepine Screen        Methadone Screen        Phencyclidine Screen        Opiates Screen        THC Screen        Cocaine Screen        Propoxyphene Screen        Buprenorphine Screen        Methamphetamine Screen        Oxycodone Screen        Tricyclic Antidepressants Screen              Other (Risk screening)     Test Value Reference Range Date Time    Varicella IgG        Parvovirus IgG        CMV IgG        Cystic Fibrosis        Hemoglobin electrophoresis        NIPT        MSAFP-4        AFP (for NTD only)              Legend    ^: Historical                      External Prenatal Results     Pregnancy Outside Results - Transcribed From Office Records - See Scanned Records For Details     Test Value Date Time    ABO ^ O  10/27/18     Rh ^ Positive  10/27/18     Antibody Screen       Varicella IgG       Rubella       Hgb  13.4 g/dL 04/14/21 1034    Hct  37.2 % 04/14/21 1034    Glucose Fasting GTT       Glucose Tolerance Test 1 hour       Glucose Tolerance Test 3 hour       Gonorrhea (discrete)       Chlamydia (discrete)       RPR       VDRL       Syphilis Antibody       HBsAg       Herpes Simplex Virus PCR       Herpes Simplex VIrus Culture       HIV       Hep C RNA Quant PCR       Hep C Antibody       AFP       Group B Strep  Positive  06/09/21  1033    GBS Susceptibility to Clindamycin       GBS Susceptibility to Erythromycin       Fetal Fibronectin       Genetic Testing, Maternal Blood             Drug Screening     Test Value Date Time    Urine Drug Screen       Amphetamine Screen       Barbiturate Screen       Benzodiazepine Screen       Methadone Screen       Phencyclidine Screen       Opiates Screen       THC Screen       Cocaine Screen       Propoxyphene Screen       Buprenorphine Screen       Methamphetamine Screen       Oxycodone Screen       Tricyclic Antidepressants Screen             Legend    ^: Historical                         Past OB History:     OB History    Para Term  AB Living   5 3 2 1 1 3   SAB TAB Ectopic Molar Multiple Live Births   1 0 0 0 0 3      # Outcome Date GA Lbr Jose R/2nd Weight Sex Delivery Anes PTL Lv   5 Current            4 Term 10/28/18 40w1d  2807 g (6 lb 3 oz) F Vag-Spont None  NITHYA      Birth Comments: Retained placenta manual extraction of placenta in OR    3 Term 16 40w3d  3062 g (6 lb 12 oz) F Vag-Spont None N NITHYA   2  05/15/15 36w0d  2410 g (5 lb 5 oz) F Vag-Spont None  NITHYA      Complications: Pre-eclampsia   1 SAB                 ALLERGIES:   No Known Allergies     Home Medications:     Prior to Admission medications    Medication Sig Start Date End Date Taking? Authorizing Provider   prenatal vitamin (prenatal, CLASSIC, vitamin) tablet Take  by mouth Daily.   Yes Provider, MD Maegan       Past Medical History: Past Medical History:   Diagnosis Date   • Gestational hypertension    • Hypertension    • Preeclampsia       Past Surgical History Past Surgical History:   Procedure Laterality Date   • DILATATION AND EVACUATION  2018    cord avulsion      Family History: No family history on file.   Social History:  reports that she has never smoked. She has never used smokeless tobacco.   reports no history of alcohol use.   reports no history of drug use.        Review of Systems                                                                                                                       Constitutional: Denies night sweats    HENT: No hearing changes, denies ear pain    Eye: No eye pain; no foreign body in eye    Pulmonary: No hemoptysis    Cardiovascular: No claudication    GI: No hematemesis    Musculoskeletal: No arthralgias, no joint swelling    Endocrine: No polydipsia or polyuria    Hematologic: Denies any free bleeding    Psychiatric: Denies any delusions      Objective     Documented Vitals    21 1212 21 1213 21 1245   BP: 140/63     Pulse: 90 98    Temp: 97.9 °F (36.6 °C)     SpO2:  97%    Weight:   111 kg (245 lb)          OBGyn Exam  Constitutional: Appears to be in no acute distress; Eyes: sclera normal; Endocrine system: thyroid palpate is normal; Pulmonary system: lungs clear; Cardiovascular system: heart regular rate and rhythm; Gastrointestinal system: abdomen soft nontender, active bowel sounds; Urologic system: CVA negative; Psychiatric: appropriate insight; Neurologic: gait within normal limits 5 cm per nursing staff      Last Labs  Lab Results   Component Value Date    WBC 9.82 2021    RBC 4.23 2021    HGB 13.4 2021    HCT 37.2 2021    MCV 87.9 2021    MCH 31.7 2021    MCHC 36.0 (H) 2021    RDW 13.1 2021    RDWSD 41.6 2021    MPV 11.3 2021     2021        No results found for: GLUCOSE, BUN, CREATININE, NA, K, CL, CO2, CALCIUM, PROTEINTOT, ALBUMIN, ALT, AST, ALKPHOS, BILITOT, EGFRIFNONA, GLOB, AGRATIO, BCR, ANIONGAP, BILIDIR, INDBILI    No results found for: HCGQUAL      Assessment/Plan:  1. 26 y.o. [id number].  Patient presents in spontaneous active labor will admit observe    Hannah Buchanan and I have discussed pain goals for this hospitalization after reviewing her current clinical condition, medical history and prior pain experiences.  The goal is to keep her pain level 3.  To help  achieve this, I plan to multimodal pain management as desired.       This document has been electronically signed by Jimmy Pompa MD on July 2, 2021 12:58 CDT\    Please note that portions of this note were completed with a voice recognition program.

## 2021-07-02 NOTE — TELEPHONE ENCOUNTER
" stated a ctx woke her up a few min ago. Due date tomorrow. Denies bleeding or leaking stated +fm. Has had 2 ctx in 20 min.     Reason for Disposition  • MODERATE-SEVERE abdominal pain    Additional Information  • Negative: Passed out (i.e., lost consciousness, collapsed and was not responding)  • Negative: Shock suspected (e.g., cold/pale/clammy skin, too weak to stand, low BP, rapid pulse)  • Negative: Difficult to awaken or acting confused (e.g., disoriented, slurred speech)  • Negative: [1] SEVERE abdominal pain (e.g., excruciating) AND [2] constant AND [3] present > 1 hour  • Negative: Severe bleeding (e.g., continuous red blood from vagina, or large blood clots)  • Negative: Umbilical cord hanging out of the vagina (shiny, white, curled appearance, \"like telephone cord\")  • Negative: Uncontrollable urge to push (i.e., feels like baby is coming out now)  • Negative: Can see baby  • Negative: Sounds like a life-threatening emergency to the triager  • Negative: Pregnant < 37 weeks (i.e., )  • Negative: [1] Uncertain delivery date AND [2] possibly pregnant < 37 weeks (i.e., )  • Negative: [1] First baby (primipara) AND [2] contractions < 6 minutes apart  AND [3] present 2 hours  • Negative: [1] History of prior delivery (multipara) AND [2] contractions < 10 minutes apart AND [3] present 1 hour  • Negative: [1] History of rapid prior delivery AND [2] contractions < 10 minutes apart  • Negative: [1] Leakage of fluid from vagina AND [2] green or brown in color  • Negative: [1] Leakage of fluid from vagina AND [2] leakage started > 4 hours ago  • Negative: Vaginal bleeding or spotting (Exception: brief spotting after intercourse or pelvic exam)  • Negative: Baby moving less today (e.g., kick count < 5 in 1 hour or < 10 in 2 hours)  • Negative: Severe headache or headache that won't go away  • Negative: New blurred vision or vision changes  • Negative: [1] MILD abdominal pain (e.g., doesn't interfere " "with normal activities) AND [2] constant AND [3] present > 2 hours  • Negative: Fever > 100.4 F (38.0 C)  • Negative: [1] Leakage of fluid from vagina AND [2] leakage started < 4 hours ago  • Negative: Patient sounds very sick or weak to the triager  • Negative: [1] First baby (primipara) AND [2] contractions > 5 minutes apart, or for < 2 hours    Answer Assessment - Initial Assessment Questions  1. ONSET: \"When did the symptoms begin?\"          10min ago  2. CONTRACTIONS: \"Describe the contractions that you are having.\" (e.g., duration, frequency, regularity, severity)       Had one 15 min ago the 5 min ago  3. FOSTER: \"What date are you expecting to deliver?\"      7/3  4. PARITY: \"Have you had a baby before?\" If Yes, ask: \"How long did the labor last?\"     4th pregnancy  5. FETAL MOVEMENT: \"Has the baby's movement decreased or changed significantly from normal?\"      Positive fm   6. OTHER SYMPTOMS: \"Do you have any other symptoms?\" (e.g., leaking fluid from vagina, vaginal bleeding, fever, hand/facial swelling)      Denies all.    Protocols used: PREGNANCY - LABOR-ADULT-AH      "

## 2021-07-02 NOTE — ANESTHESIA PREPROCEDURE EVALUATION
Anesthesia Evaluation     Patient summary reviewed and Nursing notes reviewed   NPO Solid Status: > 6 hours             Airway   Mallampati: I  TM distance: >3 FB  Neck ROM: full  No difficulty expected  Dental - normal exam     Pulmonary - negative pulmonary ROS and normal exam   Cardiovascular - normal exam    (+) hypertension,       Neuro/Psych- negative ROS  GI/Hepatic/Renal/Endo - negative ROS     Musculoskeletal     Abdominal  - normal exam   Substance History - negative use     OB/GYN    (+) Pregnant, Preeclampsia, pregnancy induced hypertension        Other - negative ROS                       Anesthesia Plan    ASA 3     epidural       Anesthetic plan, all risks, benefits, and alternatives have been provided, discussed and informed consent has been obtained with: patient.

## 2021-07-03 VITALS
WEIGHT: 245 LBS | OXYGEN SATURATION: 97 % | SYSTOLIC BLOOD PRESSURE: 112 MMHG | BODY MASS INDEX: 36.18 KG/M2 | TEMPERATURE: 98.2 F | DIASTOLIC BLOOD PRESSURE: 66 MMHG | RESPIRATION RATE: 16 BRPM | HEART RATE: 81 BPM

## 2021-07-03 LAB
BASOPHILS # BLD AUTO: 0.03 10*3/MM3 (ref 0–0.2)
BASOPHILS NFR BLD AUTO: 0.2 % (ref 0–1.5)
DEPRECATED RDW RBC AUTO: 44.4 FL (ref 37–54)
EOSINOPHIL # BLD AUTO: 0.01 10*3/MM3 (ref 0–0.4)
EOSINOPHIL NFR BLD AUTO: 0.1 % (ref 0.3–6.2)
ERYTHROCYTE [DISTWIDTH] IN BLOOD BY AUTOMATED COUNT: 14.5 % (ref 12.3–15.4)
HCT VFR BLD AUTO: 36.4 % (ref 34–46.6)
HGB BLD-MCNC: 12.6 G/DL (ref 12–15.9)
HOLD SPECIMEN: NORMAL
IMM GRANULOCYTES # BLD AUTO: 0.07 10*3/MM3 (ref 0–0.05)
IMM GRANULOCYTES NFR BLD AUTO: 0.5 % (ref 0–0.5)
LYMPHOCYTES # BLD AUTO: 1.23 10*3/MM3 (ref 0.7–3.1)
LYMPHOCYTES NFR BLD AUTO: 8.6 % (ref 19.6–45.3)
MCH RBC QN AUTO: 29.8 PG (ref 26.6–33)
MCHC RBC AUTO-ENTMCNC: 34.6 G/DL (ref 31.5–35.7)
MCV RBC AUTO: 86.1 FL (ref 79–97)
MONOCYTES # BLD AUTO: 1.04 10*3/MM3 (ref 0.1–0.9)
MONOCYTES NFR BLD AUTO: 7.3 % (ref 5–12)
NEUTROPHILS NFR BLD AUTO: 11.92 10*3/MM3 (ref 1.7–7)
NEUTROPHILS NFR BLD AUTO: 83.3 % (ref 42.7–76)
NRBC BLD AUTO-RTO: 0 /100 WBC (ref 0–0.2)
PLATELET # BLD AUTO: 148 10*3/MM3 (ref 140–450)
PMV BLD AUTO: 10.8 FL (ref 6–12)
RBC # BLD AUTO: 4.23 10*6/MM3 (ref 3.77–5.28)
WBC # BLD AUTO: 14.3 10*3/MM3 (ref 3.4–10.8)

## 2021-07-03 PROCEDURE — C1755 CATHETER, INTRASPINAL: HCPCS

## 2021-07-03 PROCEDURE — 51703 INSERT BLADDER CATH COMPLEX: CPT

## 2021-07-03 PROCEDURE — 85025 COMPLETE CBC W/AUTO DIFF WBC: CPT | Performed by: OBSTETRICS & GYNECOLOGY

## 2021-07-03 PROCEDURE — 99238 HOSP IP/OBS DSCHRG MGMT 30/<: CPT | Performed by: OBSTETRICS & GYNECOLOGY

## 2021-07-03 RX ORDER — ACETAMINOPHEN 500 MG
500 TABLET ORAL EVERY 6 HOURS PRN
Start: 2021-07-03 | End: 2023-01-23

## 2021-07-03 RX ORDER — IBUPROFEN 600 MG/1
600 TABLET ORAL EVERY 6 HOURS PRN
Start: 2021-07-03 | End: 2023-01-23

## 2021-07-03 RX ORDER — DIPHENHYDRAMINE HCL 25 MG
25 CAPSULE ORAL NIGHTLY PRN
Status: DISCONTINUED | OUTPATIENT
Start: 2021-07-03 | End: 2021-07-03 | Stop reason: HOSPADM

## 2021-07-03 RX ORDER — ACETAMINOPHEN 650 MG/1
650 SUPPOSITORY RECTAL EVERY 4 HOURS PRN
Start: 2021-07-03 | End: 2023-01-23

## 2021-07-03 RX ORDER — MISOPROSTOL 200 UG/1
600 TABLET ORAL ONCE
Status: DISCONTINUED | OUTPATIENT
Start: 2021-07-03 | End: 2021-07-03 | Stop reason: HOSPADM

## 2021-07-03 RX ORDER — LANOLIN 100 %
OINTMENT (GRAM) TOPICAL
Status: DISCONTINUED | OUTPATIENT
Start: 2021-07-03 | End: 2021-07-03 | Stop reason: HOSPADM

## 2021-07-03 RX ORDER — PRENATAL VIT/IRON FUM/FOLIC AC 27MG-0.8MG
1 TABLET ORAL DAILY
Status: DISCONTINUED | OUTPATIENT
Start: 2021-07-03 | End: 2021-07-03 | Stop reason: HOSPADM

## 2021-07-03 RX ORDER — CARBOPROST TROMETHAMINE 250 UG/ML
250 INJECTION, SOLUTION INTRAMUSCULAR ONCE
Status: DISCONTINUED | OUTPATIENT
Start: 2021-07-03 | End: 2021-07-03 | Stop reason: HOSPADM

## 2021-07-03 RX ORDER — DOCUSATE SODIUM 100 MG/1
100 CAPSULE, LIQUID FILLED ORAL 2 TIMES DAILY
Status: DISCONTINUED | OUTPATIENT
Start: 2021-07-03 | End: 2021-07-03 | Stop reason: HOSPADM

## 2021-07-03 RX ORDER — OXYTOCIN/0.9 % SODIUM CHLORIDE 30/500 ML
85 PLASTIC BAG, INJECTION (ML) INTRAVENOUS ONCE
Status: DISCONTINUED | OUTPATIENT
Start: 2021-07-03 | End: 2021-07-03 | Stop reason: HOSPADM

## 2021-07-03 RX ORDER — HYDROCORTISONE 25 MG/G
1 CREAM TOPICAL AS NEEDED
Status: DISCONTINUED | OUTPATIENT
Start: 2021-07-03 | End: 2021-07-03 | Stop reason: HOSPADM

## 2021-07-03 RX ORDER — BISACODYL 10 MG
10 SUPPOSITORY, RECTAL RECTAL DAILY PRN
Status: DISCONTINUED | OUTPATIENT
Start: 2021-07-03 | End: 2021-07-03 | Stop reason: HOSPADM

## 2021-07-03 RX ORDER — OXYTOCIN/0.9 % SODIUM CHLORIDE 30/500 ML
650 PLASTIC BAG, INJECTION (ML) INTRAVENOUS ONCE
Status: DISCONTINUED | OUTPATIENT
Start: 2021-07-03 | End: 2021-07-03 | Stop reason: HOSPADM

## 2021-07-03 RX ORDER — IBUPROFEN 800 MG/1
800 TABLET ORAL EVERY 8 HOURS PRN
Status: DISCONTINUED | OUTPATIENT
Start: 2021-07-03 | End: 2021-07-03 | Stop reason: HOSPADM

## 2021-07-03 RX ORDER — SODIUM CHLORIDE 0.9 % (FLUSH) 0.9 %
1-10 SYRINGE (ML) INJECTION AS NEEDED
Status: DISCONTINUED | OUTPATIENT
Start: 2021-07-03 | End: 2021-07-03 | Stop reason: HOSPADM

## 2021-07-03 RX ORDER — HYDROCODONE BITARTRATE AND ACETAMINOPHEN 7.5; 325 MG/1; MG/1
1 TABLET ORAL EVERY 4 HOURS PRN
Status: DISCONTINUED | OUTPATIENT
Start: 2021-07-03 | End: 2021-07-03 | Stop reason: HOSPADM

## 2021-07-03 RX ADMIN — PRENATAL VIT W/ FE FUMARATE-FA TAB 27-0.8 MG 1 TABLET: 27-0.8 TAB at 09:16

## 2021-07-03 RX ADMIN — Medication: at 09:16

## 2021-07-03 RX ADMIN — DOCUSATE SODIUM 100 MG: 100 CAPSULE, LIQUID FILLED ORAL at 09:16

## 2021-07-03 NOTE — PROGRESS NOTES
Reviewed history with patient patient said last labor was 4 hours so this labor is definitely prolonged.  She remains 6 cm over the last 2 hours.  Performed amniotomy and placed IUPC.  Ultrasound confirms ROP reviewed options wants to continue to labor we will try to reach adequate Etters units

## 2021-07-03 NOTE — DISCHARGE SUMMARY
[facility]  Hannah Buchanan  : 1995  MRN: [id number]  CSN: [id number]    Discharge Summary:    Date of Admission: 2021  Date of Discharge:  7/3/2021    Admitting Diagnosis:  1. Intrauterine pregnancy @ 40w0d  2. in labor     Discharge Diagnosis:  1. Status post        History and Hospital Course:  Patient is a   who presents in labor.    Please see History and Physical for full details.       She was admitted and progressed in labor with pitocin augmentation to completely dilated because of protraction disorder.  She had a vaginal delivery of a  viable male   infant who weighed 4240 g (9 lb 5.6 oz)  and APGARs of        APGARS  One minute Five minutes Ten minutes Fifteen minutes Twenty minutes   Skin color: 1   2             Heart rate: 2   2             Grimace: 2   2              Muscle tone: 2   2              Breathin   2              Totals: 9   10              . No immediate complications were encountered. Please see procedure note for full details.      Her postpartum course has been unremarkable. She had no signs or symptoms of acute blood loss anemia. She was ambulating well, voiding without difficulty and lochia was within normal limits. She was breast feeding without difficulty. She was stable for discharge on postpartum day 1.      Precautions and instructions were discussed with her including but not limited to maintaining a regular diet at home, practicing local hygiene, pelvic rest, and signs and symptoms to report including heavy bleeding, frequent passage of clots, fainting or dizziness, foul odor of lochia, increasing pain, fever, or any other concerns.    She was asked to follow up in the office in 4 weeks.    Condition: Stable  Discharge Disposition: home  Discharge Diet: Ad graciela.  Activity at Discharge:  Pelvic rest no heavy lifting  Discharge Medications:       Discharge Medications      New Medications      Instructions Start Date   acetaminophen 650 MG  suppository  Commonly known as: TYLENOL   650 mg, Rectal, Every 4 Hours PRN      acetaminophen 500 MG tablet  Commonly known as: TYLENOL   500 mg, Oral, Every 6 Hours PRN      ibuprofen 600 MG tablet  Commonly known as: ADVIL,MOTRIN   600 mg, Oral, Every 6 Hours PRN         Continue These Medications      Instructions Start Date   prenatal (CLASSIC) vitamin  tablet  Generic drug: prenatal vitamin   Oral, Daily           Patient will restart all home medications including prenatal vitamins.        This document has been electronically signed by Jimmy Pompa MD on July 3, 2021 10:19 CDT

## 2021-07-03 NOTE — L&D DELIVERY NOTE
[facility]  Vaginal Delivery Note    Delivery     Delivery:      YOB: 2022    Time of Birth:  Gestational Age 10:07 PM   40w0d     Anesthesia:      Delivering clinician:     Forceps?   No   Vacuum? No    Shoulder dystocia present: No        Delivery narrative: Patient breast to full dilatation.  Progressed rapidly in second stage of labor delivered vaginally.  With about 2 pushes over an intact perineum.  Shoulders came without difficulty.  Infant placed on maternal abdomen for Kangaroo care.  Cord clamped and delayed fashion cut by father at maternal request.  Placenta delivered spontaneously appeared intact.  There were no lacerations of the perineum there was a small bleeding point on the right upper vulva made hemostatic with a 2-0 chromic stitch    Infant    Findings: male  infant     Infant observations: Weight: 4240 g (9 lb 5.6 oz)   Length: 21  in  Observations/Comments:        Apgars:   @ 1 minute /      @ 5 minutes          Placenta, Cord, and Fluid    Placenta delivered    at        Cord:   present.   Nuchal Cord?  no   Cord blood obtained:     Cord gases obtained:      Cord gas results: Venous:  No results found for: PHCVEN    Arterial:  No results found for: PHCART     Repair    Episiotomy: Not recorded     No    Lacerations: No   Estimated Blood Loss:300cc             Complications  Protraction disorder    Disposition  Mother to Mother Baby/Postpartum  in stable condition currently.  Baby to remains with mom  in stable condition currently.      Jimmy Pompa MD  07/02/21  22:30 CDT

## 2021-07-03 NOTE — ANESTHESIA PROCEDURE NOTES
Labor Epidural      Patient location during procedure: OB  Start Time: 7/2/2021 6:39 PM  Performed By  CRNA: Brea Crum CRNA  Preanesthetic Checklist  Completed: patient identified, IV checked, risks and benefits discussed, monitors and equipment checked, pre-op evaluation and timeout performed  Prep:  Pt Position:sitting  Sterile Tech:cap, gloves, gown, mask and sterile barrier  Prep:povidone-iodine 7.5% surgical scrub  Monitoring:blood pressure monitoring, continuous pulse oximetry and EKG  Epidural Block Procedure:  Approach:midline  Guidance:landmark technique and palpation technique  Location:L2-L3  Needle Type:Tuohy  Needle Gauge:17 G  Loss of Resistance Medium: saline  Loss of Resistance: 7cm  Cath Depth at skin:12 cm  Paresthesia: none  Aspiration:negative  Test Dose:negative  Med administered at 7/2/2021 6:56 PM  Number of Attempts: 2  Post Assessment:  Dressing:occlusive dressing applied and secured with tape  Pt Tolerance:patient tolerated the procedure well with no apparent complications  Complications:no

## 2021-07-03 NOTE — PLAN OF CARE
Problem: Adult Inpatient Plan of Care  Goal: Plan of Care Review  Outcome: Ongoing, Progressing  Flowsheets (Taken 7/3/2021 3468)  Progress: improving  Plan of Care Reviewed With: patient  Outcome Summary: VSS, pt able to walk with standby assist as feeling returns to rt leg, voids, tolerates po intake, pain appears well controlled, breast feeding well.   Goal Outcome Evaluation:

## 2021-07-03 NOTE — DISCHARGE INSTR - ACTIVITY
Notify Physician or CNM of heavy bleeding, passing clots or foul odor to your discharge  Temp above 100.4. Burning on urination, gapping or drainage from incision or episiotomy, pain not relieved by your pain meds, redness or streaking in your breast or pain in your legs.  Take medication as prescribed  Continue taking your iron or vitamins till your refills run out or as long as you are breastfeeding  Take several rest periods during the day  Baby blues are normal, and may be present around the 3rd-4th day, if they last longer than 2-3 days contact your physician.  Pelvic Rest- No douching tampons or intercourse for 6 weeks  No lifting anything heavier than the baby  Sitz bath is ok. Shower is ok. No tub baths or soaking in bath for 6 weeks.  Wear a good supportive bra 24 hrs a day to prevent engorgement.  No driving the 1st 2 weeks or as long as you are taking pain meds.  Keep all follow up appointments.

## 2021-07-19 ENCOUNTER — TELEPHONE (OUTPATIENT)
Dept: OBSTETRICS AND GYNECOLOGY | Facility: CLINIC | Age: 26
End: 2021-07-19

## 2021-07-19 NOTE — TELEPHONE ENCOUNTER
I SPOKE WITH DR. LONG ABOUT THIS PATIENT. SHE SAID THAT IT IS PROBABLY FROM BEING CONSTIPATED. I TRIED TO CALL THE PATIENT BUT DIDN'T GET AN ANSWER AND COULDN'T LEAVE A MESSAGE.

## 2022-10-06 ENCOUNTER — LAB (OUTPATIENT)
Dept: LAB | Facility: HOSPITAL | Age: 27
End: 2022-10-06

## 2022-10-06 ENCOUNTER — INITIAL PRENATAL (OUTPATIENT)
Dept: OBSTETRICS AND GYNECOLOGY | Facility: CLINIC | Age: 27
End: 2022-10-06

## 2022-10-06 VITALS — WEIGHT: 206 LBS | DIASTOLIC BLOOD PRESSURE: 72 MMHG | SYSTOLIC BLOOD PRESSURE: 124 MMHG | BODY MASS INDEX: 30.42 KG/M2

## 2022-10-06 DIAGNOSIS — Z34.80 SUPERVISION OF OTHER NORMAL PREGNANCY, ANTEPARTUM: ICD-10-CM

## 2022-10-06 DIAGNOSIS — Z36.89 ENCOUNTER FOR FETAL ANATOMIC SURVEY: ICD-10-CM

## 2022-10-06 DIAGNOSIS — O09.32 INITIAL OBSTETRIC VISIT IN SECOND TRIMESTER: Primary | ICD-10-CM

## 2022-10-06 DIAGNOSIS — O21.9 NAUSEA AND VOMITING DURING PREGNANCY PRIOR TO 22 WEEKS GESTATION: ICD-10-CM

## 2022-10-06 PROBLEM — R12 HEARTBURN DURING PREGNANCY IN THIRD TRIMESTER: Status: RESOLVED | Noted: 2021-04-14 | Resolved: 2022-10-06

## 2022-10-06 PROBLEM — Z34.92 ENCOUNTER FOR SUPERVISION OF NORMAL PREGNANCY IN SECOND TRIMESTER: Status: ACTIVE | Noted: 2021-04-14

## 2022-10-06 PROBLEM — Z37.9 NORMAL LABOR: Status: RESOLVED | Noted: 2021-07-02 | Resolved: 2022-10-06

## 2022-10-06 PROBLEM — B95.1 POSITIVE GBS TEST: Status: RESOLVED | Noted: 2021-06-10 | Resolved: 2022-10-06

## 2022-10-06 PROBLEM — O26.893 HEARTBURN DURING PREGNANCY IN THIRD TRIMESTER: Status: RESOLVED | Noted: 2021-04-14 | Resolved: 2022-10-06

## 2022-10-06 LAB
DEPRECATED RDW RBC AUTO: 54.6 FL (ref 37–54)
ERYTHROCYTE [DISTWIDTH] IN BLOOD BY AUTOMATED COUNT: 20.2 % (ref 12.3–15.4)
HCT VFR BLD AUTO: 51.6 % (ref 34–46.6)
HGB BLD-MCNC: 16.8 G/DL (ref 12–15.9)
MCH RBC QN AUTO: 26.4 PG (ref 26.6–33)
MCHC RBC AUTO-ENTMCNC: 32.6 G/DL (ref 31.5–35.7)
MCV RBC AUTO: 81.1 FL (ref 79–97)
PLATELET # BLD AUTO: 209 10*3/MM3 (ref 140–450)
PMV BLD AUTO: 11.8 FL (ref 6–12)
RBC # BLD AUTO: 6.36 10*6/MM3 (ref 3.77–5.28)
WBC NRBC COR # BLD: 10.54 10*3/MM3 (ref 3.4–10.8)

## 2022-10-06 PROCEDURE — 99214 OFFICE O/P EST MOD 30 MIN: CPT | Performed by: NURSE PRACTITIONER

## 2022-10-06 PROCEDURE — 85027 COMPLETE CBC AUTOMATED: CPT

## 2022-10-06 RX ORDER — ONDANSETRON 4 MG/1
4 TABLET, FILM COATED ORAL EVERY 8 HOURS PRN
Qty: 30 TABLET | Refills: 1 | Status: SHIPPED | OUTPATIENT
Start: 2022-10-06 | End: 2023-10-06

## 2022-10-06 NOTE — PROGRESS NOTES
[facility]  Obstetrics  Date of Service: 10/06/2022    CHIEF COMPLAINT:  New prenatal visit    HISTORY OF PRESENT ILLNESS:  Hannah Buchanan is a 27 y.o. y/o  at 17w1d by LMP (Patient's last menstrual period was 2022.).  This was a unplanned pregnancy and the patient is alone today but supported by spouse. Last delivered in 2021.  Reports nausea with no vomiting. Has tried vitamin B6 and Unisom for short time and it helped but is inquiring about zofran.  Reports breast tenderness.  She denies any vaginal bleeding.  She has started taking a prenatal vitamin. EPDS score 6/30.    REVIEW OF SYSTEMS  Review of Systems   Constitutional: Negative for chills, fatigue, fever, unexpected weight gain and unexpected weight loss.   Respiratory: Negative for shortness of breath.    Cardiovascular: Negative for chest pain and palpitations.   Gastrointestinal: Positive for nausea. Negative for abdominal pain, constipation, diarrhea and vomiting.   Genitourinary: Negative for breast discharge, breast lump, breast pain, difficulty urinating, dysuria, frequency, urinary incontinence, vaginal bleeding, vaginal discharge and vaginal pain.   Skin: Negative for rash.   Neurological: Negative for weakness and headache.   Psychiatric/Behavioral: Negative for sleep disturbance, depressed mood and stress.       PRENATAL RISK FACTORS  10/22 Problems (from 10/06/22 to present)     Problem Noted Resolved    Nausea and vomiting during pregnancy prior to 22 weeks gestation 10/6/2022 by Kathy Rios APRN No    Overview Signed 10/6/2022 11:04 AM by Kathy Rios APRN     Pt has tried unisom and vitamin B6. Requesting zofran. Counseled on risk and benefit.s          Encounter for supervision of normal pregnancy in second trimester 2021 by Angela Sanchez APRN No    Overview Addendum 10/6/2022 11:01 AM by Kathy Rios APRN     O pos/ Rubella NONimmune (Pt does not vaccinate) / GBS 36  weeks  Dating: LMP  Genetics: Declined  Tdap:   Flu:   Anatomy:   1h Glucola:   H&H/Plts:   Breast/BC?         Previous Version    History of gestational hypertension 3/3/2021 by Angela Sanchez APRN No    Overview Addendum 10/6/2022 11:02 AM by Kathy Rios APRN     Hx of GHTN with first and second pregnant  Offer Baby ASA therapy at 2nd visit         Previous Version    History of  delivery, currently pregnant in third trimester 3/3/2021 by Angela Sanchez APRN No    Overview Addendum 10/6/2022 10:03 AM by Kathy Rios APRN     Delivery at 36 weeks 2/2 hypertension with first pregnancy.          Previous Version    Late prenatal care affecting pregnancy in second trimester 2021 by Kathy Rios APRN No    Overview Addendum 10/6/2022 10:59 AM by Kathy Rios APRN     Initial visit at 17 weeks    [facility]    Declines prenatal labs and pap smear.   Hx of high blood pressure in previous pregnancies.    Per records, Preeclampsia with first pregnancy, delivered at 36 weeks.  Third pregnancy: Cord avulsion. D&E in 2018.  Pt's blood type is O positive.          Previous Version          DATING CRITERIA:  LMP (2022) -- FOSTER 3/15/2023  1TUS-NA    OBSTETRIC HISTORY:  OB History    Para Term  AB Living   6 4 3 1 1 4   SAB IAB Ectopic Molar Multiple Live Births   1       0 4      # Outcome Date GA Lbr Jose R/2nd Weight Sex Delivery Anes PTL Lv   6 Current            5 Term 21 40w0d 13:46 / 00:21 4240 g (9 lb 5.6 oz) M Vag-Spont EPI N NITHYA   4 Term 10/28/18 40w1d  2807 g (6 lb 3 oz) F Vag-Spont None  NITHYA      Birth Comments: Retained placenta manual extraction of placenta in OR    3 Term 16 40w3d  3062 g (6 lb 12 oz) F Vag-Spont None N NITHYA   2  05/15/15 36w0d  2410 g (5 lb 5 oz) F Vag-Spont None  NITHYA      Complications: Pre-eclampsia   1 SAB              GYN HISTORY:  Denies h/o sexually transmitted infections/pelvic inflammatory disease  Denies h/o  abnormal pap smears  Last pap smear:   Last Completed Pap Smear     This patient has no relevant Health Maintenance data.        Denies h/o gynecologic surgeries, including biopsies of the cervix    PAST MEDICAL HISTORY:  Past Medical History:   Diagnosis Date   • Gestational hypertension    • Hypertension    • Preeclampsia      PAST SURGICAL HISTORY:  Past Surgical History:   Procedure Laterality Date   • DILATATION AND EVACUATION  2018    cord avulsion     FAMILY HISTORY:  No family history on file.  SOCIAL HISTORY:  Social History     Socioeconomic History   • Marital status:    Tobacco Use   • Smoking status: Never Smoker   • Smokeless tobacco: Never Used   Substance and Sexual Activity   • Alcohol use: Never   • Drug use: Never   • Sexual activity: Yes     Partners: Male     GENETIC SCREENING:  Age >34 yo as of FOSTER: No  Thalassemia: No  NTD: No  CHD: No  Down Syndrome/MR/Fragile X/Autism: No  Ashkenazi Restoration with Arvin-Sachs, [place], familial dysautonomia: No  Sickle cell disease or trait: No  Hemophilia: No  Muscular dystrophy: No  Cystic fibrosis: No  Stephie's chorea: No  Birth defects: No  Genetic/chromosomal disorders: Pt's cousins on maternal uncle has Maple Syrup Urine Disease (MSUD).      INFECTION HISTORY:  TB exposure: No  HSV: No  Illness since LMP: No  Prior GBS infected child: No  STIs: No    ALLERGIES:  No Known Allergies    MEDICATIONS:  Prior to Admission medications    Medication Sig Start Date End Date Taking? Authorizing Provider   acetaminophen (TYLENOL) 500 MG tablet Take 1 tablet by mouth Every 6 (Six) Hours As Needed for Mild Pain . 7/3/21   Jimmy Pompa MD   acetaminophen (TYLENOL) 650 MG suppository Insert 1 suppository into the rectum Every 4 (Four) Hours As Needed for Mild Pain . 7/3/21   Jimmy Pompa MD   ibuprofen (ADVIL,MOTRIN) 600 MG tablet Take 1 tablet by mouth Every 6 (Six) Hours As Needed for Mild Pain . 7/3/21   Jimmy Pompa MD   prenatal vitamin  (prenatal, CLASSIC, vitamin) tablet Take  by mouth Daily.    Provider, Historical, MD       PHYSICAL EXAM:   /72   Wt 93.4 kg (206 lb)   LMP 2022   BMI 30.42 kg/m²   General: Alert, healthy, no distress, well nourished and well developed.  Neurologic: Alert, oriented to person, place, and time.  Gait normal.  Cranial nerves II-XII grossly intact.  HEENT: Normocephalic, atraumatic.  Extraocular muscles intact, pupils equal and reactive x2.    Teeth: Normal hygiene.  Neck: Supple, no adenopathy, thyroid normal size, non-tender, without nodularity, trachea midline. bilaterally.  Lungs: Normal respiratory effort.  Clear to auscultation bilaterally.  No wheezes, rhonci, or rales.  Heart: Regular rate and rhythm.  No murmer, rub or gallop.  Abdomen: Soft, non-tender, non-distended,no masses, no hepatosplenomegaly, no hernia.  Skin: No rash, no lesions.  Extremities: No cyanosis, clubbing or edema.    Bedside ultrasound performed by myself which shows the findings below: single IUP with cardiac activity, appears to consistent with LMP.       IMPRESSION:  Hannah Buchanan is a 27 y.o.  at 17w1d for a new prenatal visit.    PLAN:  1.  IUP at 17w1d  - Options counseling performed and patient desires continuation of pregnancy to term   - Prenatal labs reviewed and pt declines most labwork. Will check CBC today. Pt is self-pay.   - Genetic testing, including cystic fibrosis, was discussed and patient declines  - Continue prenatal vitamins  - Weight gain counseling performed.   - Pregravid BMI 25-29.9: Recommend 15-25 lb  - Return to clinic in 3  weeks for return prenatal visit and Anatomy U/S.  - Reviewed COVID-19 visitation policy  - Reviewed COVID-19 precautions     Diagnosis Plan   1. Initial obstetric visit in second trimester     2. Encounter for fetal anatomic survey  US Ob 14 + Weeks Single or First Gestation   3. Supervision of other normal pregnancy, antepartum  CBC (No Diff)   4. Nausea and vomiting  during pregnancy prior to 22 weeks gestation  ondansetron (Zofran) 4 MG tablet     Kathy Edwards, DELIA  10/6/2022  12:34 CDT

## 2022-10-27 DIAGNOSIS — O36.80X0 ENCOUNTER TO DETERMINE FETAL VIABILITY OF PREGNANCY, SINGLE OR UNSPECIFIED FETUS: Primary | ICD-10-CM

## 2022-11-01 ENCOUNTER — TELEPHONE (OUTPATIENT)
Dept: OBSTETRICS AND GYNECOLOGY | Facility: CLINIC | Age: 27
End: 2022-11-01

## 2022-11-01 DIAGNOSIS — O36.80X0 ENCOUNTER TO DETERMINE FETAL VIABILITY OF PREGNANCY, SINGLE OR UNSPECIFIED FETUS: ICD-10-CM

## 2022-11-28 ENCOUNTER — ROUTINE PRENATAL (OUTPATIENT)
Dept: OBSTETRICS AND GYNECOLOGY | Facility: CLINIC | Age: 27
End: 2022-11-28

## 2022-11-28 VITALS — WEIGHT: 221 LBS | BODY MASS INDEX: 32.64 KG/M2 | SYSTOLIC BLOOD PRESSURE: 122 MMHG | DIASTOLIC BLOOD PRESSURE: 82 MMHG

## 2022-11-28 DIAGNOSIS — O09.32 LATE PRENATAL CARE AFFECTING PREGNANCY IN SECOND TRIMESTER: ICD-10-CM

## 2022-11-28 DIAGNOSIS — O21.9 NAUSEA AND VOMITING DURING PREGNANCY PRIOR TO 22 WEEKS GESTATION: ICD-10-CM

## 2022-11-28 DIAGNOSIS — Z34.02 ENCOUNTER FOR SUPERVISION OF NORMAL FIRST PREGNANCY IN SECOND TRIMESTER: ICD-10-CM

## 2022-11-28 DIAGNOSIS — O09.893 HISTORY OF PRETERM DELIVERY, CURRENTLY PREGNANT IN THIRD TRIMESTER: ICD-10-CM

## 2022-11-28 DIAGNOSIS — Z87.59 HISTORY OF GESTATIONAL HYPERTENSION: ICD-10-CM

## 2022-11-28 DIAGNOSIS — Z3A.20 20 WEEKS GESTATION OF PREGNANCY: Primary | ICD-10-CM

## 2022-11-28 PROCEDURE — 99213 OFFICE O/P EST LOW 20 MIN: CPT | Performed by: NURSE PRACTITIONER

## 2022-11-28 NOTE — PROGRESS NOTES
CC: Prenatal visit    Hannah Buchanan is a 27 y.o.  at 20w6d.  Doing well.  Denies N/V, dysuria, abnormal vaginal d/c, headaches, heartburn, constipation, cramping, regular contractions, LOF, or VB.  Reports good FM. Having a baby boy this pregnancy. Pt is happy as she has 3 girls and 1 boy at home. Unsure of the name yet.     /82   Wt 100 kg (221 lb)   LMP 2022   BMI 32.64 kg/m²   SVE: Deferred     Fetal Heart Rate: 153 us     Anatomy: Cephalic. Posterior placenta, no previa. PCI WNL. EFW 13 oz. All anatomy seen and are normal in appearance. Male fetus. 3VC. CL 4.32cm.     10/22 Problems (from 10/06/22 to present)     Problem Noted Resolved    Nausea and vomiting during pregnancy prior to 22 weeks gestation 10/6/2022 by Kathy Rios APRN No    Overview Addendum 2022 10:06 AM by Kathy Rios APRN     Pt has tried unisom and vitamin B6. Requesting zofran. Counseled on risk and benefits.    : Much improved. Has zofran at home.           Encounter for supervision of normal pregnancy in second trimester 2021 by Angela Sanchez APRN No    Overview Addendum 2022 10:06 AM by Kathy Rios APRN     O pos/ Rubella NONimmune (Pt does not vaccinate) / GBS 36 weeks  Dating: by 16w2d US FOSTER 2023  Genetics: Declined  Tdap:   Flu:   Anatomy: Male fetus. WNL  1h Glucola:   H&H/Plts:   Breast/BC?         History of gestational hypertension 3/3/2021 by Angela Sanchez APRN No    Overview Addendum 10/6/2022 11:02 AM by Kathy Rios APRN     Hx of GHTN with first and second pregnant  Offer Baby ASA therapy at 2nd visit         History of  delivery, currently pregnant in third trimester 3/3/2021 by Angela Sanchez APRN No    Overview Addendum 10/6/2022 10:03 AM by Kathy Rios APRN     Delivery at 36 weeks 2/2 hypertension with first pregnancy.          Late prenatal care affecting pregnancy in second trimester 2021 by Kathy Rios APRN No     Overview Addendum 10/6/2022 10:59 AM by Kathy Rios APRN     Initial visit at 17 weeks    [facility]    Declines prenatal labs and pap smear.   Hx of high blood pressure in previous pregnancies.    Per records, Preeclampsia with first pregnancy, delivered at 36 weeks.  Third pregnancy: Cord avulsion. D&E in 2018.  Pt's blood type is O positive.                A/P: Hannah Buchanan is a 27 y.o.  at 20w6d.  - Anatomy U/S complete. No additional U/S needed at this time.   - Discussed 3T labs at 28 weeks  - RTC in 4 weeks  - Reviewed COVID-19 visitation policy  - Reviewed COVID-19 precautions     Diagnosis Plan   1. 20 weeks gestation of pregnancy        2. Encounter for supervision of normal first pregnancy in second trimester        3. History of gestational hypertension        4. History of  delivery, currently pregnant in third trimester        5. Late prenatal care affecting pregnancy in second trimester        6. Nausea and vomiting during pregnancy prior to 22 weeks gestation          DELIA Pop  2022  10:09 CST

## 2022-11-29 DIAGNOSIS — Z36.89 ENCOUNTER FOR FETAL ANATOMIC SURVEY: ICD-10-CM

## 2023-01-23 ENCOUNTER — ROUTINE PRENATAL (OUTPATIENT)
Dept: OBSTETRICS AND GYNECOLOGY | Facility: CLINIC | Age: 28
End: 2023-01-23

## 2023-01-23 ENCOUNTER — LAB (OUTPATIENT)
Dept: LAB | Facility: HOSPITAL | Age: 28
End: 2023-01-23

## 2023-01-23 VITALS — SYSTOLIC BLOOD PRESSURE: 126 MMHG | WEIGHT: 238 LBS | DIASTOLIC BLOOD PRESSURE: 72 MMHG | BODY MASS INDEX: 35.15 KG/M2

## 2023-01-23 DIAGNOSIS — Z34.83 ENCOUNTER FOR SUPERVISION OF OTHER NORMAL PREGNANCY IN THIRD TRIMESTER: ICD-10-CM

## 2023-01-23 DIAGNOSIS — Z3A.28 28 WEEKS GESTATION OF PREGNANCY: Primary | ICD-10-CM

## 2023-01-23 DIAGNOSIS — Z36.89 ENCOUNTER FOR OTHER SPECIFIED ANTENATAL SCREENING: ICD-10-CM

## 2023-01-23 DIAGNOSIS — O09.893 HISTORY OF PRETERM DELIVERY, CURRENTLY PREGNANT IN THIRD TRIMESTER: ICD-10-CM

## 2023-01-23 DIAGNOSIS — O09.32 LATE PRENATAL CARE AFFECTING PREGNANCY IN SECOND TRIMESTER: ICD-10-CM

## 2023-01-23 LAB
DEPRECATED RDW RBC AUTO: 44.3 FL (ref 37–54)
ERYTHROCYTE [DISTWIDTH] IN BLOOD BY AUTOMATED COUNT: 13.5 % (ref 12.3–15.4)
GLUCOSE 1H P 100 G GLC PO SERPL-MCNC: 125 MG/DL (ref 65–139)
HCT VFR BLD AUTO: 39.2 % (ref 34–46.6)
HGB BLD-MCNC: 12.9 G/DL (ref 12–15.9)
MCH RBC QN AUTO: 29.7 PG (ref 26.6–33)
MCHC RBC AUTO-ENTMCNC: 32.9 G/DL (ref 31.5–35.7)
MCV RBC AUTO: 90.3 FL (ref 79–97)
PLATELET # BLD AUTO: 191 10*3/MM3 (ref 140–450)
PMV BLD AUTO: 11.2 FL (ref 6–12)
RBC # BLD AUTO: 4.34 10*6/MM3 (ref 3.77–5.28)
WBC NRBC COR # BLD: 9.66 10*3/MM3 (ref 3.4–10.8)

## 2023-01-23 PROCEDURE — 99213 OFFICE O/P EST LOW 20 MIN: CPT | Performed by: NURSE PRACTITIONER

## 2023-01-23 PROCEDURE — 85027 COMPLETE CBC AUTOMATED: CPT

## 2023-01-23 PROCEDURE — 82950 GLUCOSE TEST: CPT

## 2023-01-23 RX ORDER — CLOBETASOL PROPIONATE 0.5 MG/G
1 OINTMENT TOPICAL 2 TIMES DAILY
Qty: 1 EACH | Refills: 1 | Status: SHIPPED | OUTPATIENT
Start: 2023-01-23

## 2023-02-13 ENCOUNTER — ROUTINE PRENATAL (OUTPATIENT)
Dept: OBSTETRICS AND GYNECOLOGY | Facility: CLINIC | Age: 28
End: 2023-02-13

## 2023-02-13 VITALS — WEIGHT: 242 LBS | SYSTOLIC BLOOD PRESSURE: 114 MMHG | DIASTOLIC BLOOD PRESSURE: 76 MMHG | BODY MASS INDEX: 35.74 KG/M2

## 2023-02-13 DIAGNOSIS — O09.32 LATE PRENATAL CARE AFFECTING PREGNANCY IN SECOND TRIMESTER: ICD-10-CM

## 2023-02-13 DIAGNOSIS — Z34.03 ENCOUNTER FOR SUPERVISION OF NORMAL FIRST PREGNANCY IN THIRD TRIMESTER: ICD-10-CM

## 2023-02-13 DIAGNOSIS — Z87.59 HISTORY OF GESTATIONAL HYPERTENSION: ICD-10-CM

## 2023-02-13 DIAGNOSIS — Z3A.31 31 WEEKS GESTATION OF PREGNANCY: Primary | ICD-10-CM

## 2023-02-13 DIAGNOSIS — O09.893 HISTORY OF PRETERM DELIVERY, CURRENTLY PREGNANT IN THIRD TRIMESTER: ICD-10-CM

## 2023-02-13 PROBLEM — Z34.93 ENCOUNTER FOR SUPERVISION OF NORMAL PREGNANCY IN THIRD TRIMESTER: Status: ACTIVE | Noted: 2021-04-14

## 2023-02-13 PROCEDURE — 99213 OFFICE O/P EST LOW 20 MIN: CPT | Performed by: NURSE PRACTITIONER

## 2023-02-13 NOTE — PROGRESS NOTES
CC: Prenatal visit    Hannah Buchanan is a 27 y.o.  at 31w6d.  Doing well.  Denies N/V, dysuria, abnormal vaginal d/c, headaches, heartburn, constipation, cramping, regular contractions, LOF, or VB.  Reports good FM. Shares that it is difficult to come to appts on  and ;  are better. Has been checking her blood pressure at home and they have been normal; hx of gestational hypertension.     /76   Wt 110 kg (242 lb)   LMP 2022   BMI 35.74 kg/m²   SVE: Deferred  Fundal Height (cm): 32 cm  Fetal Heart Rate: 135    10/22 Problems (from 10/06/22 to present)     Problem Noted Resolved    Nausea and vomiting during pregnancy prior to 22 weeks gestation 10/6/2022 by Kathy Rios APRN No    Overview Addendum 2022 10:06 AM by Kathy Rios APRN     Pt has tried unisom and vitamin B6. Requesting zofran. Counseled on risk and benefits.    : Much improved. Has zofran at home.           Encounter for supervision of normal pregnancy in third trimester 2021 by Angela Sanchez APRN No    Overview Addendum 2023 10:22 AM by Kathy Rios APRN     O pos/ Rubella NONimmune (Pt does not vaccinate) / GBS 36 weeks  Dating: by 16w2d US FOSTER 2023  Genetics: Declined  Tdap: Declined  Anatomy: Male fetus. WNL  1h Glucola: passed  Breast/BC?         History of gestational hypertension 3/3/2021 by Angela Sanchez APRN No    Overview Addendum 10/6/2022 11:02 AM by Kathy Rios APRN     Hx of GHTN with first and second pregnant  Offer Baby ASA therapy at 2nd visit         History of  delivery, currently pregnant in third trimester 3/3/2021 by Angela Sanchez APRN No    Overview Addendum 10/6/2022 10:03 AM by Kathy Rios APRN     Delivery at 36 weeks 2/2 hypertension with first pregnancy.          Late prenatal care affecting pregnancy in second trimester 2021 by Kathy Rios, DELIA No    Overview Addendum 10/6/2022 10:59 AM by  Kathy Rios, DELIA     Initial visit at 17 weeks    [facility]    Declines prenatal labs and pap smear.   Hx of high blood pressure in previous pregnancies.    Per records, Preeclampsia with first pregnancy, delivered at 36 weeks.  Third pregnancy: Cord avulsion. D&E in 2018.  Pt's blood type is O positive.                A/P: Hannah Buchanan is a 27 y.o.  at 31w6d.  - Discussed no anemia and GDM in pregnancy.   - Discussed prenatal visits schedule; may return in 3 weeks. Needs a Wednesday appt.   - GBS swab next visit.  - PTL and fetal kick count preacutions  - RTC in 3 weeks    Diagnosis Plan   1. 31 weeks gestation of pregnancy        2. Encounter for supervision of normal first pregnancy in third trimester        3. History of gestational hypertension        4. History of  delivery, currently pregnant in third trimester        5. Late prenatal care affecting pregnancy in second trimester          Kahty Edwards, DELIA  2023  10:28 CST

## 2023-03-13 ENCOUNTER — ROUTINE PRENATAL (OUTPATIENT)
Dept: OBSTETRICS AND GYNECOLOGY | Facility: CLINIC | Age: 28
End: 2023-03-13

## 2023-03-13 VITALS — DIASTOLIC BLOOD PRESSURE: 80 MMHG | BODY MASS INDEX: 36.77 KG/M2 | WEIGHT: 249 LBS | SYSTOLIC BLOOD PRESSURE: 120 MMHG

## 2023-03-13 DIAGNOSIS — O21.9 NAUSEA AND VOMITING DURING PREGNANCY PRIOR TO 22 WEEKS GESTATION: ICD-10-CM

## 2023-03-13 DIAGNOSIS — Z3A.35 35 WEEKS GESTATION OF PREGNANCY: ICD-10-CM

## 2023-03-13 DIAGNOSIS — Z34.03 ENCOUNTER FOR SUPERVISION OF NORMAL FIRST PREGNANCY IN THIRD TRIMESTER: Primary | ICD-10-CM

## 2023-03-13 DIAGNOSIS — Z87.59 HISTORY OF GESTATIONAL HYPERTENSION: ICD-10-CM

## 2023-03-13 DIAGNOSIS — O09.33 LATE PRENATAL CARE AFFECTING PREGNANCY IN THIRD TRIMESTER: ICD-10-CM

## 2023-03-13 DIAGNOSIS — O09.893 HISTORY OF PRETERM DELIVERY, CURRENTLY PREGNANT IN THIRD TRIMESTER: ICD-10-CM

## 2023-03-13 DIAGNOSIS — Z36.85 ENCOUNTER FOR ANTENATAL SCREENING FOR STREPTOCOCCUS B: ICD-10-CM

## 2023-03-13 PROCEDURE — 87653 STREP B DNA AMP PROBE: CPT | Performed by: NURSE PRACTITIONER

## 2023-03-13 PROCEDURE — 99213 OFFICE O/P EST LOW 20 MIN: CPT | Performed by: NURSE PRACTITIONER

## 2023-03-13 NOTE — PROGRESS NOTES
CC: Prenatal visit    Hannah Buchanan is a 27 y.o.  at 35w6d.  Doing well.  Denies dysuria, abnormal vaginal d/c, headaches, heartburn, constipation, cramping, regular contractions, LOF, or VB.  Reports good FM. Having some nausea and vomiting again in just the past few days but not too bothersome.     /80   Wt 113 kg (249 lb)   LMP 2022   BMI 36.77 kg/m²   SVE: Declined. GBS obtained.   Fundal Height (cm): 35 cm  Fetal Heart Rate: 150    10/22 Problems (from 10/06/22 to present)     Problem Noted Resolved    Nausea and vomiting during pregnancy prior to 22 weeks gestation 10/6/2022 by Kathy Rios APRN No    Overview Addendum 2022 10:06 AM by Kathy Rios APRN     Pt has tried unisom and vitamin B6. Requesting zofran. Counseled on risk and benefits.    : Much improved. Has zofran at home.           Encounter for supervision of normal pregnancy in third trimester 2021 by Angela Sanchez APRN No    Overview Addendum 2023 10:22 AM by Kathy Rios APRN     O pos/ Rubella NONimmune (Pt does not vaccinate) / GBS 36 weeks  Dating: by 16w2d US FOSTER 2023  Genetics: Declined  Tdap: Declined  Anatomy: Male fetus. WNL  1h Glucola: passed  Breast/BC?         History of gestational hypertension 3/3/2021 by Angela Sanchez APRN No    Overview Addendum 10/6/2022 11:02 AM by Kathy Rios APRN     Hx of GHTN with first and second pregnant  Offer Baby ASA therapy at 2nd visit         History of  delivery, currently pregnant in third trimester 3/3/2021 by Angela Sanchez APRN No    Overview Addendum 10/6/2022 10:03 AM by Kathy Rios APRN     Delivery at 36 weeks 2/2 hypertension with first pregnancy.          Late prenatal care affecting pregnancy in third trimester 2021 by Kathy Rios APRN No    Overview Addendum 10/6/2022 10:59 AM by Kathy Rios APRN     Initial visit at 17 weeks    [facility]    Declines prenatal labs and pap  smear.   Hx of high blood pressure in previous pregnancies.    Per records, Preeclampsia with first pregnancy, delivered at 36 weeks.  Third pregnancy: Cord avulsion. D&E in 2018.  Pt's blood type is O positive.                A/P: Hannah Buchanan is a 27 y.o.  at 35w6d.  - RTC in 10 days  - PTL precautions reviewed.   - Reviewed COVID-19 visitation policy  - Reviewed COVID-19 precautions     Diagnosis Plan   1. Encounter for supervision of normal first pregnancy in third trimester  Group B Strep (Molecular) - Swab, Vaginal/Rectum    Group B Strep (Molecular) - Swab, Vaginal/Rectum      2. History of gestational hypertension  Group B Strep (Molecular) - Swab, Vaginal/Rectum    Group B Strep (Molecular) - Swab, Vaginal/Rectum      3. History of  delivery, currently pregnant in third trimester  Group B Strep (Molecular) - Swab, Vaginal/Rectum    Group B Strep (Molecular) - Swab, Vaginal/Rectum      4. Late prenatal care affecting pregnancy in third trimester  Group B Strep (Molecular) - Swab, Vaginal/Rectum    Group B Strep (Molecular) - Swab, Vaginal/Rectum      5. Nausea and vomiting during pregnancy prior to 22 weeks gestation  Group B Strep (Molecular) - Swab, Vaginal/Rectum    Group B Strep (Molecular) - Swab, Vaginal/Rectum      6. Encounter for  screening for Streptococcus B  Group B Strep (Molecular) - Swab, Vaginal/Rectum    Group B Strep (Molecular) - Swab, Vaginal/Rectum      7. 35 weeks gestation of pregnancy          Angela Sanchez, APRN  3/13/2023  11:04 CDT

## 2023-03-15 LAB — GROUP B STREP, DNA: NEGATIVE

## 2023-03-22 ENCOUNTER — ROUTINE PRENATAL (OUTPATIENT)
Dept: OBSTETRICS AND GYNECOLOGY | Facility: CLINIC | Age: 28
End: 2023-03-22

## 2023-03-22 VITALS — SYSTOLIC BLOOD PRESSURE: 130 MMHG | BODY MASS INDEX: 36.92 KG/M2 | DIASTOLIC BLOOD PRESSURE: 84 MMHG | WEIGHT: 250 LBS

## 2023-03-22 DIAGNOSIS — O21.9 NAUSEA AND VOMITING DURING PREGNANCY PRIOR TO 22 WEEKS GESTATION: ICD-10-CM

## 2023-03-22 DIAGNOSIS — Z34.83 ENCOUNTER FOR SUPERVISION OF OTHER NORMAL PREGNANCY IN THIRD TRIMESTER: Primary | ICD-10-CM

## 2023-03-22 DIAGNOSIS — O09.893 HISTORY OF PRETERM DELIVERY, CURRENTLY PREGNANT IN THIRD TRIMESTER: ICD-10-CM

## 2023-03-22 DIAGNOSIS — Z87.59 HISTORY OF GESTATIONAL HYPERTENSION: ICD-10-CM

## 2023-03-22 DIAGNOSIS — Z3A.37 37 WEEKS GESTATION OF PREGNANCY: ICD-10-CM

## 2023-03-22 DIAGNOSIS — O09.33 LATE PRENATAL CARE AFFECTING PREGNANCY IN THIRD TRIMESTER: ICD-10-CM

## 2023-03-22 RX ORDER — OMEPRAZOLE 40 MG/1
40 CAPSULE, DELAYED RELEASE ORAL DAILY
Qty: 30 CAPSULE | Refills: 6 | OUTPATIENT
Start: 2023-03-22

## 2023-03-22 NOTE — PROGRESS NOTES
CC: Prenatal visit    Hannah Buchanan is a 27 y.o.  at 37w1d.  Doing well.  Denies N/V, dysuria, abnormal vaginal d/c, headaches, constipation, cramping, regular contractions, LOF, or VB.  Reports good FM. Frequent heartburn, especially after eating and while lying flat.     /84   Wt 113 kg (250 lb)   LMP 2022   BMI 36.92 kg/m²   SVE: NA  Fundal Height (cm): 37 cm  Fetal Heart Rate: 141    10/22 Problems (from 10/06/22 to present)     Problem Noted Resolved    Nausea and vomiting during pregnancy prior to 22 weeks gestation 10/6/2022 by Kathy Rios APRN No    Overview Addendum 2022 10:06 AM by Kathy Rios APRN     Pt has tried unisom and vitamin B6. Requesting zofran. Counseled on risk and benefits.    : Much improved. Has zofran at home.           Encounter for supervision of normal pregnancy in third trimester 2021 by Angela Sanchez APRN No    Overview Addendum 2023 10:22 AM by Kathy Rios APRN     O pos/ Rubella NONimmune (Pt does not vaccinate) / GBS 36 weeks  Dating: by 16w2d US FOSTER 2023  Genetics: Declined  Tdap: Declined  Anatomy: Male fetus. WNL  1h Glucola: passed  Breast/BC?         History of gestational hypertension 3/3/2021 by Angela Sanchez APRN No    Overview Addendum 10/6/2022 11:02 AM by Kathy Rios APRN     Hx of GHTN with first and second pregnant  Offer Baby ASA therapy at 2nd visit         History of  delivery, currently pregnant in third trimester 3/3/2021 by Angela Sanchez APRN No    Overview Addendum 10/6/2022 10:03 AM by Kathy Rios APRN     Delivery at 36 weeks 2/2 hypertension with first pregnancy.          Late prenatal care affecting pregnancy in third trimester 2021 by Kathy Rios APRN No    Overview Addendum 10/6/2022 10:59 AM by Kathy Rios APRN     Initial visit at 17 weeks    [facility]    Declines prenatal labs and pap smear.   Hx of high blood pressure in previous  pregnancies.    Per records, Preeclampsia with first pregnancy, delivered at 36 weeks.  Third pregnancy: Cord avulsion. D&E in 2018.  Pt's blood type is O positive.                A/P: Hannah Buchanan is a 27 y.o.  at 37w1d.  - RTC in 10 days  - Start on omeprazole 40mg daily; pt wants to buy OTC  - Reviewed COVID-19 visitation policy  - Reviewed COVID-19 precautions     Diagnosis Plan   1. Encounter for supervision of other normal pregnancy in third trimester        2. History of gestational hypertension        3. History of  delivery, currently pregnant in third trimester        4. Late prenatal care affecting pregnancy in third trimester        5. Nausea and vomiting during pregnancy prior to 22 weeks gestation        6. 37 weeks gestation of pregnancy          Angela Sanchez, APRN  3/22/2023  16:37 CDT

## 2023-04-03 ENCOUNTER — ROUTINE PRENATAL (OUTPATIENT)
Dept: OBSTETRICS AND GYNECOLOGY | Facility: CLINIC | Age: 28
End: 2023-04-03

## 2023-04-03 VITALS — SYSTOLIC BLOOD PRESSURE: 126 MMHG | DIASTOLIC BLOOD PRESSURE: 70 MMHG | WEIGHT: 255 LBS | BODY MASS INDEX: 37.66 KG/M2

## 2023-04-03 DIAGNOSIS — O09.33 LATE PRENATAL CARE AFFECTING PREGNANCY IN THIRD TRIMESTER: ICD-10-CM

## 2023-04-03 DIAGNOSIS — Z34.03 ENCOUNTER FOR SUPERVISION OF NORMAL FIRST PREGNANCY IN THIRD TRIMESTER: Primary | ICD-10-CM

## 2023-04-03 DIAGNOSIS — O09.893 HISTORY OF PRETERM DELIVERY, CURRENTLY PREGNANT IN THIRD TRIMESTER: ICD-10-CM

## 2023-04-03 DIAGNOSIS — Z3A.38 38 WEEKS GESTATION OF PREGNANCY: ICD-10-CM

## 2023-04-03 DIAGNOSIS — Z87.59 HISTORY OF GESTATIONAL HYPERTENSION: ICD-10-CM

## 2023-04-03 NOTE — PROGRESS NOTES
CC: Prenatal visit    Hannah Buchanan is a 27 y.o.  at 38w6d.  Doing well.  Denies N/V, dysuria, abnormal vaginal d/c, headaches, heartburn, constipation, cramping, regular contractions, LOF, or VB.  Reports good FM.    /70   Wt 116 kg (255 lb)   LMP 2022   BMI 37.66 kg/m²   SVE: NA  Fundal Height (cm): 38 cm  Fetal Heart Rate: 138    10/22 Problems (from 10/06/22 to present)     Problem Noted Resolved    Single liveborn infant delivered vaginally 2023 by Ludy Martínez MD No    Shoulder dystocia during labor and delivery 2023 by Ludy Martínez MD No    Overview Signed 2023  5:29 PM by Ludy Martínez MD     McRobert's, suprapubic pressure         Normal labor 2023 by Ludy Martínez MD No    Nausea and vomiting during pregnancy prior to 22 weeks gestation 10/6/2022 by Kathy Rios APRN No    Overview Addendum 2022 10:06 AM by Kathy Rios APRN     Pt has tried unisom and vitamin B6. Requesting zofran. Counseled on risk and benefits.    : Much improved. Has zofran at home.           Encounter for supervision of normal pregnancy in third trimester 2021 by Angela Sanchez APRN No    Overview Addendum 2023 10:22 AM by Kathy Rios APRN     O pos/ Rubella NONimmune (Pt does not vaccinate) / GBS 36 weeks  Dating: by 16w2d US FOSTER 2023  Genetics: Declined  Tdap: Declined  Anatomy: Male fetus. WNL  1h Glucola: passed  Breast/BC?         History of gestational hypertension 3/3/2021 by Angela Sanchez APRN No    Overview Addendum 10/6/2022 11:02 AM by Kathy Rios APRN     Hx of GHTN with first and second pregnant  Offer Baby ASA therapy at 2nd visit         History of  delivery, currently pregnant in third trimester 3/3/2021 by Angela Sanchez APRN No    Overview Addendum 10/6/2022 10:03 AM by Kathy Rios APRN     Delivery at 36 weeks 2/2 hypertension  with first pregnancy.          Late prenatal care affecting pregnancy in third trimester 2021 by Kathy Rios APRN No    Overview Addendum 10/6/2022 10:59 AM by Kathy Rios APRN     Initial visit at 17 weeks    [facility]    Declines prenatal labs and pap smear.   Hx of high blood pressure in previous pregnancies.    Per records, Preeclampsia with first pregnancy, delivered at 36 weeks.  Third pregnancy: Cord avulsion. D&E in 2018.  Pt's blood type is O positive.                A/P: Hannah Buchanan is a 27 y.o.  at 38w6d.  - RTC in  Week  - term labor precautions reviewed  - EIOL for postdates set for 23 @ 0530  - Reviewed COVID-19 visitation policy  - Reviewed COVID-19 precautions     Diagnosis Plan   1. Encounter for supervision of normal first pregnancy in third trimester        2. History of gestational hypertension        3. History of  delivery, currently pregnant in third trimester        4. Late prenatal care affecting pregnancy in third trimester        5. 38 weeks gestation of pregnancy          DELIA Ray  2023  15:14 CDT

## 2023-04-12 ENCOUNTER — HOSPITAL ENCOUNTER (INPATIENT)
Facility: HOSPITAL | Age: 28
LOS: 1 days | Discharge: HOME | End: 2023-04-13
Attending: OBSTETRICS & GYNECOLOGY | Admitting: OBSTETRICS & GYNECOLOGY

## 2023-04-12 PROBLEM — Z37.9 NORMAL LABOR: Status: ACTIVE | Noted: 2023-04-12

## 2023-04-12 LAB
AMPHET+METHAMPHET UR QL: NEGATIVE
AMPHETAMINES UR QL: NEGATIVE
BARBITURATES UR QL SCN: NEGATIVE
BENZODIAZ UR QL SCN: NEGATIVE
BUPRENORPHINE SERPL-MCNC: NEGATIVE NG/ML
CANNABINOIDS SERPL QL: NEGATIVE
COCAINE UR QL: NEGATIVE
METHADONE UR QL SCN: NEGATIVE
OPIATES UR QL: NEGATIVE
OXYCODONE UR QL SCN: NEGATIVE
PCP UR QL SCN: NEGATIVE
PROPOXYPH UR QL: NEGATIVE
TRICYCLICS UR QL SCN: NEGATIVE

## 2023-04-12 PROCEDURE — 86850 RBC ANTIBODY SCREEN: CPT | Performed by: OBSTETRICS & GYNECOLOGY

## 2023-04-12 PROCEDURE — 86901 BLOOD TYPING SEROLOGIC RH(D): CPT | Performed by: OBSTETRICS & GYNECOLOGY

## 2023-04-12 PROCEDURE — 85007 BL SMEAR W/DIFF WBC COUNT: CPT | Performed by: OBSTETRICS & GYNECOLOGY

## 2023-04-12 PROCEDURE — 85027 COMPLETE CBC AUTOMATED: CPT | Performed by: OBSTETRICS & GYNECOLOGY

## 2023-04-12 PROCEDURE — 80306 DRUG TEST PRSMV INSTRMNT: CPT | Performed by: OBSTETRICS & GYNECOLOGY

## 2023-04-12 PROCEDURE — 86900 BLOOD TYPING SEROLOGIC ABO: CPT | Performed by: OBSTETRICS & GYNECOLOGY

## 2023-04-12 PROCEDURE — 59025 FETAL NON-STRESS TEST: CPT

## 2023-04-12 RX ORDER — SODIUM CHLORIDE, SODIUM LACTATE, POTASSIUM CHLORIDE, CALCIUM CHLORIDE 600; 310; 30; 20 MG/100ML; MG/100ML; MG/100ML; MG/100ML
125 INJECTION, SOLUTION INTRAVENOUS CONTINUOUS
Status: DISCONTINUED | OUTPATIENT
Start: 2023-04-12 | End: 2023-04-13 | Stop reason: HOSPADM

## 2023-04-12 RX ORDER — SODIUM CHLORIDE 0.9 % (FLUSH) 0.9 %
10 SYRINGE (ML) INJECTION AS NEEDED
Status: DISCONTINUED | OUTPATIENT
Start: 2023-04-12 | End: 2023-04-13 | Stop reason: HOSPADM

## 2023-04-12 RX ORDER — SODIUM CHLORIDE 0.9 % (FLUSH) 0.9 %
10 SYRINGE (ML) INJECTION EVERY 12 HOURS SCHEDULED
Status: DISCONTINUED | OUTPATIENT
Start: 2023-04-12 | End: 2023-04-13 | Stop reason: HOSPADM

## 2023-04-12 RX ADMIN — SODIUM CHLORIDE, POTASSIUM CHLORIDE, SODIUM LACTATE AND CALCIUM CHLORIDE 125 ML/HR: 600; 310; 30; 20 INJECTION, SOLUTION INTRAVENOUS at 22:38

## 2023-04-13 ENCOUNTER — ANESTHESIA (OUTPATIENT)
Dept: LABOR AND DELIVERY | Facility: HOSPITAL | Age: 28
End: 2023-04-13

## 2023-04-13 ENCOUNTER — ANESTHESIA EVENT (OUTPATIENT)
Dept: LABOR AND DELIVERY | Facility: HOSPITAL | Age: 28
End: 2023-04-13

## 2023-04-13 VITALS
OXYGEN SATURATION: 95 % | HEART RATE: 78 BPM | WEIGHT: 251 LBS | DIASTOLIC BLOOD PRESSURE: 68 MMHG | BODY MASS INDEX: 37.07 KG/M2 | RESPIRATION RATE: 18 BRPM | SYSTOLIC BLOOD PRESSURE: 122 MMHG | TEMPERATURE: 98.1 F

## 2023-04-13 LAB
ABO GROUP BLD: NORMAL
ANISOCYTOSIS BLD QL: NORMAL
BLD GP AB SCN SERPL QL: NEGATIVE
HCT VFR BLD AUTO: NORMAL %
HCT VFR BLD AUTO: NORMAL %
HGB BLD-MCNC: NORMAL G/DL
HGB BLD-MCNC: NORMAL G/DL
Lab: NORMAL
MCH RBC QN AUTO: NORMAL PG
MCH RBC QN AUTO: NORMAL PG
MCHC RBC AUTO-ENTMCNC: NORMAL G/DL
MCHC RBC AUTO-ENTMCNC: NORMAL G/DL
MCV RBC AUTO: NORMAL FL
MCV RBC AUTO: NORMAL FL
OVALOCYTES BLD QL SMEAR: NORMAL
PLATELET # BLD AUTO: NORMAL 10*3/UL
PLATELET # BLD AUTO: NORMAL 10*3/UL
RBC # BLD AUTO: NORMAL 10*6/UL
RBC # BLD AUTO: NORMAL 10*6/UL
RH BLD: POSITIVE
SMALL PLATELETS BLD QL SMEAR: ADEQUATE
T&S EXPIRATION DATE: NORMAL
WBC MORPH BLD: NORMAL
WBC NRBC COR # BLD: NORMAL 10*3/UL
WBC NRBC COR # BLD: NORMAL 10*3/UL

## 2023-04-13 PROCEDURE — 25010000002 FENTANYL CITRATE (PF) 100 MCG/2ML SOLUTION

## 2023-04-13 PROCEDURE — C1755 CATHETER, INTRASPINAL: HCPCS

## 2023-04-13 PROCEDURE — 51703 INSERT BLADDER CATH COMPLEX: CPT

## 2023-04-13 PROCEDURE — 85027 COMPLETE CBC AUTOMATED: CPT | Performed by: OBSTETRICS & GYNECOLOGY

## 2023-04-13 RX ORDER — SODIUM CHLORIDE 0.9 % (FLUSH) 0.9 %
1-10 SYRINGE (ML) INJECTION AS NEEDED
Status: CANCELLED | OUTPATIENT
Start: 2023-04-13

## 2023-04-13 RX ORDER — ONDANSETRON 4 MG/1
4 TABLET, FILM COATED ORAL EVERY 6 HOURS PRN
Status: DISCONTINUED | OUTPATIENT
Start: 2023-04-13 | End: 2023-04-13 | Stop reason: HOSPADM

## 2023-04-13 RX ORDER — CARBOPROST TROMETHAMINE 250 UG/ML
250 INJECTION, SOLUTION INTRAMUSCULAR
Status: DISCONTINUED | OUTPATIENT
Start: 2023-04-13 | End: 2023-04-13 | Stop reason: HOSPADM

## 2023-04-13 RX ORDER — OXYTOCIN/0.9 % SODIUM CHLORIDE 30/500 ML
2 PLASTIC BAG, INJECTION (ML) INTRAVENOUS
Status: DISCONTINUED | OUTPATIENT
Start: 2023-04-13 | End: 2023-04-13 | Stop reason: HOSPADM

## 2023-04-13 RX ORDER — LIDOCAINE HYDROCHLORIDE AND EPINEPHRINE 15; 5 MG/ML; UG/ML
INJECTION, SOLUTION EPIDURAL AS NEEDED
Status: DISCONTINUED | OUTPATIENT
Start: 2023-04-13 | End: 2023-04-13 | Stop reason: SURG

## 2023-04-13 RX ORDER — BUPIVACAINE HYDROCHLORIDE 5 MG/ML
INJECTION, SOLUTION EPIDURAL; INTRACAUDAL AS NEEDED
Status: DISCONTINUED | OUTPATIENT
Start: 2023-04-13 | End: 2023-04-13 | Stop reason: SURG

## 2023-04-13 RX ORDER — FENTANYL CITRATE 50 UG/ML
INJECTION, SOLUTION INTRAMUSCULAR; INTRAVENOUS AS NEEDED
Status: DISCONTINUED | OUTPATIENT
Start: 2023-04-13 | End: 2023-04-13 | Stop reason: SURG

## 2023-04-13 RX ORDER — FERROUS SULFATE TAB EC 324 MG (65 MG FE EQUIVALENT) 324 (65 FE) MG
324 TABLET DELAYED RESPONSE ORAL 2 TIMES DAILY WITH MEALS
Status: DISCONTINUED | OUTPATIENT
Start: 2023-04-13 | End: 2023-04-13 | Stop reason: HOSPADM

## 2023-04-13 RX ORDER — HYDROCORTISONE 25 MG/G
1 CREAM TOPICAL 3 TIMES DAILY PRN
Status: DISCONTINUED | OUTPATIENT
Start: 2023-04-13 | End: 2023-04-13 | Stop reason: HOSPADM

## 2023-04-13 RX ORDER — IBUPROFEN 600 MG/1
600 TABLET ORAL EVERY 6 HOURS SCHEDULED
Status: DISCONTINUED | OUTPATIENT
Start: 2023-04-13 | End: 2023-04-13 | Stop reason: HOSPADM

## 2023-04-13 RX ORDER — OXYTOCIN/0.9 % SODIUM CHLORIDE 30/500 ML
999 PLASTIC BAG, INJECTION (ML) INTRAVENOUS ONCE
Status: DISCONTINUED | OUTPATIENT
Start: 2023-04-13 | End: 2023-04-13 | Stop reason: HOSPADM

## 2023-04-13 RX ORDER — ACETAMINOPHEN 325 MG/1
650 TABLET ORAL EVERY 6 HOURS
Status: DISCONTINUED | OUTPATIENT
Start: 2023-04-13 | End: 2023-04-13 | Stop reason: HOSPADM

## 2023-04-13 RX ORDER — OXYTOCIN/0.9 % SODIUM CHLORIDE 30/500 ML
250 PLASTIC BAG, INJECTION (ML) INTRAVENOUS CONTINUOUS
Status: ACTIVE | OUTPATIENT
Start: 2023-04-13 | End: 2023-04-13

## 2023-04-13 RX ORDER — ONDANSETRON 2 MG/ML
4 INJECTION INTRAMUSCULAR; INTRAVENOUS EVERY 6 HOURS PRN
Status: DISCONTINUED | OUTPATIENT
Start: 2023-04-13 | End: 2023-04-13 | Stop reason: HOSPADM

## 2023-04-13 RX ORDER — DOCUSATE SODIUM 100 MG/1
100 CAPSULE, LIQUID FILLED ORAL 2 TIMES DAILY
Status: DISCONTINUED | OUTPATIENT
Start: 2023-04-13 | End: 2023-04-13 | Stop reason: HOSPADM

## 2023-04-13 RX ORDER — METHYLERGONOVINE MALEATE 0.2 MG/ML
200 INJECTION INTRAVENOUS ONCE AS NEEDED
Status: DISCONTINUED | OUTPATIENT
Start: 2023-04-13 | End: 2023-04-13 | Stop reason: HOSPADM

## 2023-04-13 RX ORDER — MISOPROSTOL 200 UG/1
800 TABLET ORAL ONCE AS NEEDED
Status: DISCONTINUED | OUTPATIENT
Start: 2023-04-13 | End: 2023-04-13 | Stop reason: HOSPADM

## 2023-04-13 RX ORDER — BISACODYL 10 MG
10 SUPPOSITORY, RECTAL RECTAL DAILY PRN
Status: DISCONTINUED | OUTPATIENT
Start: 2023-04-14 | End: 2023-04-13 | Stop reason: HOSPADM

## 2023-04-13 RX ORDER — PRENATAL VIT/IRON FUM/FOLIC AC 27MG-0.8MG
1 TABLET ORAL DAILY
Status: DISCONTINUED | OUTPATIENT
Start: 2023-04-13 | End: 2023-04-13 | Stop reason: HOSPADM

## 2023-04-13 RX ORDER — CALCIUM CARBONATE 200(500)MG
2 TABLET,CHEWABLE ORAL 3 TIMES DAILY PRN
Status: DISCONTINUED | OUTPATIENT
Start: 2023-04-13 | End: 2023-04-13 | Stop reason: HOSPADM

## 2023-04-13 RX ADMIN — OXYTOCIN-SODIUM CHLORIDE 0.9% IV SOLN 30 UNIT/500ML 2 MILLI-UNITS/MIN: 30-0.9/5 SOLUTION at 05:53

## 2023-04-13 RX ADMIN — FENTANYL CITRATE 100 MCG: 50 INJECTION, SOLUTION INTRAMUSCULAR; INTRAVENOUS at 07:27

## 2023-04-13 RX ADMIN — BUPIVACAINE HYDROCHLORIDE 2.5 ML: 5 INJECTION, SOLUTION EPIDURAL; INTRACAUDAL; PERINEURAL at 07:26

## 2023-04-13 RX ADMIN — SODIUM CHLORIDE, POTASSIUM CHLORIDE, SODIUM LACTATE AND CALCIUM CHLORIDE 125 ML/HR: 600; 310; 30; 20 INJECTION, SOLUTION INTRAVENOUS at 05:57

## 2023-04-13 RX ADMIN — SODIUM CHLORIDE, POTASSIUM CHLORIDE, SODIUM LACTATE AND CALCIUM CHLORIDE 1000 ML: 600; 310; 30; 20 INJECTION, SOLUTION INTRAVENOUS at 07:45

## 2023-04-13 RX ADMIN — LIDOCAINE HYDROCHLORIDE AND EPINEPHRINE 3 ML: 15; 5 INJECTION, SOLUTION EPIDURAL at 07:19

## 2023-04-13 NOTE — H&P
[facility]  HISTORY & PHYSICAL - Obstetrics    Name: Hannah Buchanan  MRN: [id number]  Location:   Date: 2023   CSN: [id number]      CHIEF COMPLAINT:  Contractions    HISTORY OF PRESENT ILLNESS  Hannah Buchanan is a 27 y.o.  at 40w1d who presents with a chief complaint of contractions every 2-6 minutes.  Denies LOF or vaginal bleeding.  Reports good FM.  She was scheduled for IOL on Friday at 40w3d.    Patient denies any chest pain, palpitations, headaches, lightheadedness, shortness of breath, cough, nausea, vomiting, diarrhea, constipation, fever, or chills.    ROS  Review of Systems   Constitutional: Negative.    HENT: Negative.    Eyes: Negative.    Respiratory: Negative.    Cardiovascular: Negative.    Gastrointestinal: Negative.    Endocrine: Negative.    Genitourinary: Negative.    Musculoskeletal: Negative.    Skin: Negative.    Allergic/Immunologic: Negative.    Neurological: Negative.    Hematological: Negative.    Psychiatric/Behavioral: Negative.      PRENATAL LAB RESULTS  Prenatal labs reviewed  External Prenatal Results     Pregnancy Outside Results - Transcribed From Office Records - See Scanned Records For Details     Test Value Date Time    ABO  O  21 1358    Rh  Positive  21 1358    Antibody Screen  Negative  21 1306    Varicella IgG       Rubella       Hgb  12.9 g/dL 23 1054       16.8 g/dL 10/06/22 1037    Hct  39.2 % 23 1054       51.6 % 10/06/22 1037    Glucose Fasting GTT       Glucose Tolerance Test 1 hour       Glucose Tolerance Test 3 hour       Gonorrhea (discrete)       Chlamydia (discrete)       RPR       VDRL       Syphilis Antibody       HBsAg       Herpes Simplex Virus PCR       Herpes Simplex VIrus Culture       HIV       Hep C RNA Quant PCR       Hep C Antibody       AFP       Group B Strep  Negative  23 1004    GBS Susceptibility to Clindamycin       GBS Susceptibility to Erythromycin        Fetal Fibronectin       Genetic Testing, Maternal Blood             Drug Screening     Test Value Date Time    Urine Drug Screen       Amphetamine Screen  Negative  23    Barbiturate Screen  Negative  23    Benzodiazepine Screen  Negative  23    Methadone Screen  Negative  23    Phencyclidine Screen  Negative  23    Opiates Screen  Negative  23    THC Screen  Negative  23    Cocaine Screen       Propoxyphene Screen  Negative  23    Buprenorphine Screen  Negative  23    Methamphetamine Screen       Oxycodone Screen  Negative  23    Tricyclic Antidepressants Screen  Negative  23          Legend    ^: Historical                      PRENATAL RISK FACTORS  10/22 Problems (from 10/06/22 to present)     Problem Noted Resolved    Normal labor 2023 by Ludy Martínez MD No    Nausea and vomiting during pregnancy prior to 22 weeks gestation 10/6/2022 by Kathy Rios APRN No    Overview Addendum 2022 10:06 AM by Kathy Rios APRN     Pt has tried unisom and vitamin B6. Requesting zofran. Counseled on risk and benefits.    : Much improved. Has zofran at home.           Encounter for supervision of normal pregnancy in third trimester 2021 by Angela Sanchez APRN No    Overview Addendum 2023 10:22 AM by Kathy Rios APRN     O pos/ Rubella NONimmune (Pt does not vaccinate) / GBS 36 weeks  Dating: by 16w2d US FOSTER 2023  Genetics: Declined  Tdap: Declined  Anatomy: Male fetus. WNL  1h Glucola: passed  Breast/BC?         History of gestational hypertension 3/3/2021 by Angela Sanchez APRN No    Overview Addendum 10/6/2022 11:02 AM by Kathy Rios APRN     Hx of GHTN with first and second pregnant  Offer Baby ASA therapy at 2nd visit         History of  delivery, currently pregnant in third trimester 3/3/2021 by Angela Sanchez APRN  No    Overview Addendum 10/6/2022 10:03 AM by Kathy Rios APRN     Delivery at 36 weeks 2/2 hypertension with first pregnancy.          Late prenatal care affecting pregnancy in third trimester 2021 by Kathy Rios APRN No    Overview Addendum 10/6/2022 10:59 AM by Kathy Rios APRN     Initial visit at 17 weeks    Mennonite    Declines prenatal labs and pap smear.   Hx of high blood pressure in previous pregnancies.    Per records, Preeclampsia with first pregnancy, delivered at 36 weeks.  Third pregnancy: Cord avulsion. D&E in 2018.  Pt's blood type is O positive.              OB HISTORY  OB History    Para Term  AB Living   6 4 3 1 1 4   SAB IAB Ectopic Molar Multiple Live Births   1       0 4      # Outcome Date GA Lbr Jose R/2nd Weight Sex Delivery Anes PTL Lv   6 Current            5 Term 21 40w0d 13:46 / 00:21 4240 g (9 lb 5.6 oz) M Vag-Spont EPI N NITHYA   4 Term 10/28/18 40w1d  2807 g (6 lb 3 oz) F Vag-Spont None  NITHYA      Birth Comments: Retained placenta manual extraction of placenta in OR    3 Term 16 40w3d  3062 g (6 lb 12 oz) F Vag-Spont None N NITHYA   2  05/15/15 36w0d  2410 g (5 lb 5 oz) F Vag-Spont None  NITHYA      Complications: Pre-eclampsia   1 SAB              PAST MEDICAL HISTORY  Past Medical History:   Diagnosis Date   • History of pre-eclampsia      PAST SURGICAL HISTORY  Past Surgical History:   Procedure Laterality Date   • DILATATION AND EVACUATION  2018    cord avulsion     FAMILY HISTORY  No family history on file.    SOCIAL HISTORY  Social History     Socioeconomic History   • Marital status:    Tobacco Use   • Smoking status: Never   • Smokeless tobacco: Never   Vaping Use   • Vaping Use: Never used   Substance and Sexual Activity   • Alcohol use: Never   • Drug use: Never   • Sexual activity: Yes     Partners: Male     ALLERGIES  No Known Allergies    HOME MEDICATIONS  Prior to Admission medications    Medication Sig Start  Date End Date Taking? Authorizing Provider   omeprazole (priLOSEC) 40 MG capsule Take 1 capsule by mouth Daily. 3/22/23  Yes Angela Sanchez APRN   ondansetron (Zofran) 4 MG tablet Take 1 tablet by mouth Every 8 (Eight) Hours As Needed for Nausea or Vomiting. 10/6/22 10/6/23 Yes Kathy Rios APRN   clobetasol (TEMOVATE) 0.05 % ointment Apply 1 application topically to the appropriate area as directed 2 (Two) Times a Day. As needed for eczema on head. Use sparingly. 23   Kathy Rios APRN   prenatal vitamin (prenatal, CLASSIC, vitamin) tablet Take  by mouth Daily.    Provider, Maegan, MD     PHYSICAL EXAM  /76 (BP Location: Right arm, Patient Position: Lying)   Pulse 80   Temp 98.7 °F (37.1 °C) (Oral)   Resp 20   Wt 114 kg (251 lb)   LMP 2022   SpO2 95%   Breastfeeding Yes   BMI 37.07 kg/m²   General: No acute distress. Well developed, well nourished. Pleasant.  Heart: Regular rate and rhythm. No murmurs, rubs, or gallops  Lungs: Clear to auscultation bilaterally. No wheezes, rales, or rhonchi.  Abdomen: Soft, nontender to palpation, enlarged by gravid uterus.    NST Review  Indication: Labor  FHT: Baseline 130 bpm, moderate variability, pos accelerations, neg decelerations.  Placentia:  Regular contractions every 2-5 minutes.  Impression: Cat 1 FHT    SVE per RN: 2-3/ 70/ -2, vertex    IMPRESSION  Hannah Buchanan is a 27 y.o.  at 40w1d admitted with in latent labor.  Will augment as necessary.    PLAN  1.  IUP at 40w1d with labor  - Admit: Labor and Delivery  - Attending: Dr. Martínez  - Condition: Stable  - Vitals: per protocol  - Activity: ad graciela  - Nursing: Continuous electronic fetal monitoring, as per protocol  - Diet: Clears  - IV fluids:  mL/hr  - Meds: None, possible pitocin augmentation as necessary  - Allergies: NKDA  - Labs: CBC, T&S, UDS  - GBS: negative.  Antibiotics: N/A  - Hannah Buchanan and I have discussed pain goals for this  hospitalization after reviewing her current clinical condition, medical history and prior pain experiences.  The goal is to keep her pain level appropriate.  Patient may have epidural if desired.  - Anticipate     This document has been electronically signed by Ludy Martínez MD on 2023 23:23 CDT.

## 2023-04-13 NOTE — NON STRESS TEST
Hannah Buchanan, a  at 40w1d with an FOSTER of 2023, by Ultrasound, was seen at [facility] LABOR DELIVERY for a nonstress test.    Chief Complaint   Patient presents with   • Contractions       Patient Active Problem List   Diagnosis   • Late prenatal care affecting pregnancy in third trimester   • History of gestational hypertension   • History of  delivery, currently pregnant in third trimester   • Encounter for supervision of normal pregnancy in third trimester   • Nausea and vomiting during pregnancy prior to 22 weeks gestation   • Normal labor       Start Time:   Stop Time:     Interpretation A  Nonstress Test Interpretation A: Reactive  Comments A: Reviewed with JOHN Cuenca RN

## 2023-04-13 NOTE — DISCHARGE SUMMARY
[facility]  Discharge Summary  Patient Name: Hannah Buchanan  : 1995  MRN: [id number]  CSN: [id number]    Discharge Summary    Date of Admission: 2023   Date of Discharge: 2023    Principle Discharge Dx: Active Hospital Problems    Diagnosis  POA   • **Normal labor [O80, Z37.9]  Not Applicable   • Single liveborn infant delivered vaginally [Z38.00]  No   • Shoulder dystocia during labor and delivery [O66.0]  No     McRobert's, suprapubic pressure     • Nausea and vomiting during pregnancy prior to 22 weeks gestation [O21.9]  Yes     Pt has tried unisom and vitamin B6. Requesting zofran. Counseled on risk and benefits.    : Much improved. Has zofran at home.       • Encounter for supervision of normal pregnancy in third trimester [Z34.93]  Not Applicable     O pos/ Rubella NONimmune (Pt does not vaccinate) / GBS 36 weeks  Dating: by 16w2d US FOSTER 2023  Genetics: Declined  Tdap: Declined  Anatomy: Male fetus. WNL  1h Glucola: passed  Breast/BC?     • History of gestational hypertension [Z87.59]  Not Applicable     Hx of GHTN with first and second pregnant  Offer Baby ASA therapy at 2nd visit     • History of  delivery, currently pregnant in third trimester [O09.893]  Not Applicable     Delivery at 36 weeks 2/2 hypertension with first pregnancy.      • Late prenatal care affecting pregnancy in third trimester [O09.33]  Not Applicable     Initial visit at 17 weeks    Mennonite    Declines prenatal labs and pap smear.   Hx of high blood pressure in previous pregnancies.    Per records, Preeclampsia with first pregnancy, delivered at 36 weeks.  Third pregnancy: Cord avulsion. D&E in 2018.  Pt's blood type is O positive.         Procedures Performed:    Brief History: Patient is a 27 y.o. now  who presented to labor and delivery at 40w1d in latent labor.   Hospital Course: Patient presented at 40w1d in latent labor.  She progressed spontaneously  but did require pitocin augmentation as well as amniotomy.  She had a ; delivery was complicated by shoulder dystocia that was resolved with McRobert's and suprapubic pressure.  Her postpartum course was unremarkable.  On PPD #0 she expressed the desire for discharge.  She had passed gas and was urinating normally.  She was eating a regular diet without difficulty.  She was ambulating well.  Discharge instructions were given.  All questions were answered   Condition:  Discharge Activity: Stable  Activity Instructions     Bathing Restrictions      Type of Restriction: Bathing    Bathing Restrictions: Other    Explain Bathing Restrictions: No soaking in bathtub for 4 weeks. Showers are fine.    Driving Restrictions      Type of Restriction: Driving    Driving Restrictions: No Driving (Time Limited)    Length: Other    Indicate Length of Restriction: No driving for 1 week or if using narcotic pain medications. Riding is car is fine.    Lifting Restrictions      Type of Restriction: Lifting    Lifting Restrictions: Other    Explain Lifing Restrictions: No lifting more than infant and baby carrier together for 6 weeks.    Pelvic Rest      Nothing in the vagina for 6 weeks to include tampons, intercourse, or douching.    Sexual Activity Restrictions      Type of Restriction: Sex    Explain Sexual Activity Restrictions: No sexual intercourse for at least 6 weeks         Discharge Diet: Diet Instructions     Diet: Regular/House Diet      Discharge Diet: Regular/House Diet    Texture: Regular Texture (IDDSI 7)    Fluid Consistency: Thin (IDDSI 0)         Discharge Medications:    Your medication list      CONTINUE taking these medications      Instructions Last Dose Given Next Dose Due   clobetasol 0.05 % ointment  Commonly known as: TEMOVATE      Apply 1 application topically to the appropriate area as directed 2 (Two) Times a Day. As needed for eczema on head. Use sparingly.       omeprazole 40 MG capsule  Commonly  known as: priLOSEC      Take 1 capsule by mouth Daily.       ondansetron 4 MG tablet  Commonly known as: Zofran      Take 1 tablet by mouth Every 8 (Eight) Hours As Needed for Nausea or Vomiting.       prenatal (CLASSIC) vitamin  tablet  Generic drug: prenatal vitamin      Take  by mouth Daily.             Discharge Disposition: Home   Follow-up: No future appointments.  2 week PP visit (telephone)  6 week PP visit     <30 minutes was spent with the patient on the day of discharge.    This document has been electronically signed by Ludy Martínez MD on April 13, 2023 17:29 CDT.

## 2023-04-13 NOTE — L&D DELIVERY NOTE
[facility]  Vaginal Delivery Note    Patient Name: Hannah Buchanan  : 1995  MRN: [id number]  Date of Delivery: 2023     Diagnosis     Pre & Post-Delivery:  Intrauterine pregnancy at 40w2d  Labor status:      Normal labor    Late prenatal care affecting pregnancy in third trimester    History of gestational hypertension    History of  delivery, currently pregnant in third trimester    Encounter for supervision of normal pregnancy in third trimester    Nausea and vomiting during pregnancy prior to 22 weeks gestation    Single liveborn infant delivered vaginally    Shoulder dystocia during labor and delivery             Problem List    Transfer to Postpartum     Review the Delivery Report for details.     Delivery     Delivery: Vaginal, Spontaneous     YOB: 2024    Time of Birth:  Gestational Age 10:48 AM   40w2d     Anesthesia: Epidural     Delivering clinician: Ludy Martínez    Forceps?   No   Vacuum? No    Shoulder dystocia present: Yes Shoulder Dystocia Details  Dystocia Present? Yes   Time head delivered: 2023 10:48 AM   Gentle attempt at traction, assisted by maternal expulsive forces: Yes   If no, why:    Anterior shoulder:    Time recognized: 2023 10:48 AM     Time help called:   Called by:     Time provider arrived:   Provider who arrived:     Time NICU arrived:   NICU staff:     Time additional staff arrived:   Additional staff:            Delivery narrative:    Patient presented on  at 40w1d in latent labor.  She progressed spontaneously to 4 cm but required augmentation with pitocin.  She had an amniotomy at 8:39AM for clear fluid when was 4-5 cm.  She progressed to complete cervical dilation at 10:31AM.  Patient began pushing at 10:45AM; she pushed to deliver a viable 4250g male from the JUANY position over an intact perineum via  under epidural anesthesia on 2023 at 10:48AM.  No noted nuchal cord.   During attempted delivery of shoulders, shoulder dystocia was identified.  The patient was instructed to stop pushing.  No fundal pressure was applied.  The right shoulder was anterior.  Manuevers performed included: McRobert's, suprapubic pressure.  An episiotomy was not indicated.  Traction was put on the head for 7 seconds at 35 degree angle.  The time interval between delivery of the fetal head and body was 10 seconds.  The right anterior shoulder was delivered, followed by left posterior shoulder with ease.  Body was then delivered with gentle traction.  Mouth and nose bulb suctioned.  3 vessel cord clamped x2 and cut after 30 seconds of delayed clamping.  Baby was placed on mother's chest.  Cord blood was obtained and sent.  Placenta delivered spontaneously intact and Pitocin was started.  Cervix, vagina, and perineum were examined and no laceration was noted.  EBL 200mL; QBL pending, please see nursing documentation.  Apgar scores 8/9.  Mother and infant stable.  Delivery information was discussed with patient and family.    Shoulder dystocia- had to move , maynor, suprapubic      Infant     Findings: male  infant     Infant observations: Weight: 4250 g (9 lb 5.9 oz)   Length: 21.5  in  Observations/Comments:        Apgars: 8  @ 1 minute /    9  @ 5 minutes   Infant Name: Baby Boy     Placenta & Cord         Placenta delivered  Spontaneous  at   2023 10:52 AM     Cord: 3 vessels  present.   Nuchal Cord?  no   Cord blood obtained: Yes    Cord gases obtained:  No    Cord gas results: Venous:  No results found for: PHCVEN    Arterial:  No results found for: PHCART     Repair      Episiotomy: None    No    Lacerations: No   Estimated Blood Loss:       Quantitative Blood Loss: Quantitative Blood Loss (mL): 499 mL (23 1300)        Complications     Shoulder dystocia    Disposition     Mother to Mother Baby/Postpartum in stable condition currently.  Baby to remains with mom in stable condition  currently.    Ludy Martínez MD  04/13/23  17:32 CDT

## 2023-04-13 NOTE — PLAN OF CARE
Problem: Adult Inpatient Plan of Care  Goal: Plan of Care Review  Outcome: Ongoing, Progressing  Flowsheets (Taken 4/13/2023 0634)  Progress: improving  Plan of Care Reviewed With:   patient   spouse  Outcome Evaluation: VSS, latest SVE is 5/70-80/-2, pitocin augmentation started at 0553, does not want meds to bed, baby boy, plans to breast feed, does not know baby name yet, pain controlled, requesting epidural now just waiting on lab results  Goal: Patient-Specific Goal (Individualized)  Outcome: Ongoing, Progressing  Goal: Absence of Hospital-Acquired Illness or Injury  Outcome: Ongoing, Progressing  Intervention: Identify and Manage Fall Risk  Recent Flowsheet Documentation  Taken 4/12/2023 2305 by Lizette Quezada RN  Safety Promotion/Fall Prevention:   safety round/check completed   clutter free environment maintained   assistive device/personal items within reach  Intervention: Prevent Skin Injury  Recent Flowsheet Documentation  Taken 4/12/2023 2305 by Lizette Quezada RN  Body Position:   position changed independently   supine  Intervention: Prevent and Manage VTE (Venous Thromboembolism) Risk  Recent Flowsheet Documentation  Taken 4/13/2023 0008 by Lizette Quezada RN  Activity Management:   ambulated in room   ambulated to bathroom  Taken 4/12/2023 2305 by Lizette Quezada RN  Activity Management: up ad graciela  VTE Prevention/Management: patient refused intervention  Intervention: Prevent Infection  Recent Flowsheet Documentation  Taken 4/12/2023 2305 by Lizette Quezada RN  Infection Prevention:   visitors restricted/screened   single patient room provided   rest/sleep promoted  Goal: Optimal Comfort and Wellbeing  Outcome: Ongoing, Progressing  Intervention: Provide Person-Centered Care  Recent Flowsheet Documentation  Taken 4/12/2023 2305 by Lizette Quezada RN  Trust Relationship/Rapport:   care explained   choices provided   questions answered   questions encouraged   reassurance provided  Goal:  Readiness for Transition of Care  Outcome: Ongoing, Progressing  Intervention: Mutually Develop Transition Plan  Recent Flowsheet Documentation  Taken 4/12/2023 2246 by Lizette Quezada, RN  Equipment Currently Used at Home: none     Problem: Bleeding (Labor)  Goal: Hemostasis  Outcome: Ongoing, Progressing     Problem: Change in Fetal Wellbeing (Labor)  Goal: Stable Fetal Wellbeing  Outcome: Ongoing, Progressing  Intervention: Promote and Monitor Fetal Wellbeing  Recent Flowsheet Documentation  Taken 4/12/2023 2305 by Lizette Quezada, RN  Body Position:   position changed independently   supine     Problem: Delayed Labor Progression (Labor)  Goal: Effective Progression to Delivery  Outcome: Ongoing, Progressing     Problem: Infection (Labor)  Goal: Absence of Infection Signs and Symptoms  Outcome: Ongoing, Progressing  Intervention: Prevent or Manage Infection  Recent Flowsheet Documentation  Taken 4/12/2023 2305 by Lizette Quezada, RN  Infection Prevention:   visitors restricted/screened   single patient room provided   rest/sleep promoted     Problem: Labor Pain (Labor)  Goal: Acceptable Pain Control  Outcome: Ongoing, Progressing     Problem: Uterine Tachysystole (Labor)  Goal: Normal Uterine Contraction Pattern  Outcome: Ongoing, Progressing   Goal Outcome Evaluation:  Plan of Care Reviewed With: patient, spouse        Progress: improving  Outcome Evaluation: VSS, latest SVE is 5/70-80/-2, pitocin augmentation started at 0553, does not want meds to bed, baby boy, plans to breast feed, does not know baby name yet, pain controlled, requesting epidural now just waiting on lab results

## 2023-04-13 NOTE — ANESTHESIA PROCEDURE NOTES
Labor Epidural      Patient reassessed immediately prior to procedure    Start Time: 4/13/2023 7:01 AM  Stop Time: 4/13/2023 7:19 AM  Performed By  CRNA/YOLY: Avi Simon CRNASRNA: Cammie Aaron SRNA  Preanesthetic Checklist  Completed: patient identified, IV checked, site marked, risks and benefits discussed, surgical consent, monitors and equipment checked, pre-op evaluation and timeout performed  Prep:  Pt Position:sitting  Sterile Tech:cap, gloves, sterile barrier and mask  Prep:povidone-iodine 7.5% surgical scrub  Monitoring:blood pressure monitoring, continuous pulse oximetry and EKG  Epidural Block Procedure:  Approach:midline  Guidance:landmark technique  Location:L3-L4  Needle Type:Tuohy  Needle Gauge:17 G  Loss of Resistance: 8cm  Cath Depth at skin:14 cm  Paresthesia: none  Aspiration:negative  Test Dose:negative  Number of Attempts: 1  Post Assessment:  Dressing:occlusive dressing applied, secured with tape and biopatch applied  Pt Tolerance:patient tolerated the procedure well with no apparent complications  Complications:no

## 2023-04-13 NOTE — ANESTHESIA PREPROCEDURE EVALUATION
Anesthesia Evaluation     Patient summary reviewed and Nursing notes reviewed   NPO Solid Status: > 2 hours  NPO Liquid Status: > 2 hours           Airway   Mallampati: II  TM distance: >3 FB  Neck ROM: full  No difficulty expected  Dental - normal exam     Pulmonary - negative pulmonary ROS and normal exam   Cardiovascular - negative cardio ROS and normal exam        Neuro/Psych- negative ROS  GI/Hepatic/Renal/Endo - negative ROS     Musculoskeletal (-) negative ROS    Abdominal  - normal exam   Substance History - negative use     OB/GYN    (+) Pregnant,         Other                        Anesthesia Plan    ASA 2     epidural       Anesthetic plan, risks, benefits, and alternatives have been provided, discussed and informed consent has been obtained with: patient.        CODE STATUS:    Level Of Support Discussed With: Patient  Code Status (Patient has no pulse and is not breathing): CPR (Attempt to Resuscitate)  Medical Interventions (Patient has pulse or is breathing): Full Support  Release to patient: Routine Release

## 2023-06-05 ENCOUNTER — POSTPARTUM VISIT (OUTPATIENT)
Dept: OBSTETRICS AND GYNECOLOGY | Facility: CLINIC | Age: 28
End: 2023-06-05

## 2023-06-05 VITALS — WEIGHT: 234 LBS | BODY MASS INDEX: 34.66 KG/M2 | HEIGHT: 69 IN

## 2023-06-05 DIAGNOSIS — Z30.011 ENCOUNTER FOR INITIAL PRESCRIPTION OF CONTRACEPTIVE PILLS: ICD-10-CM

## 2023-06-05 PROBLEM — O09.33 LATE PRENATAL CARE AFFECTING PREGNANCY IN THIRD TRIMESTER: Status: RESOLVED | Noted: 2021-01-25 | Resolved: 2023-06-05

## 2023-06-05 PROBLEM — Z87.59 HISTORY OF GESTATIONAL HYPERTENSION: Status: RESOLVED | Noted: 2021-03-03 | Resolved: 2023-06-05

## 2023-06-05 PROBLEM — Z37.9 NORMAL LABOR: Status: RESOLVED | Noted: 2023-04-12 | Resolved: 2023-06-05

## 2023-06-05 PROBLEM — O09.893 HISTORY OF PRETERM DELIVERY, CURRENTLY PREGNANT IN THIRD TRIMESTER: Status: RESOLVED | Noted: 2021-03-03 | Resolved: 2023-06-05

## 2023-06-05 PROBLEM — Z34.93 ENCOUNTER FOR SUPERVISION OF NORMAL PREGNANCY IN THIRD TRIMESTER: Status: RESOLVED | Noted: 2021-04-14 | Resolved: 2023-06-05

## 2023-06-05 PROBLEM — O21.9 NAUSEA AND VOMITING DURING PREGNANCY PRIOR TO 22 WEEKS GESTATION: Status: RESOLVED | Noted: 2022-10-06 | Resolved: 2023-06-05

## 2023-06-05 RX ORDER — ACETAMINOPHEN AND CODEINE PHOSPHATE 120; 12 MG/5ML; MG/5ML
1 SOLUTION ORAL DAILY
Qty: 84 TABLET | Refills: 4 | Status: SHIPPED | OUTPATIENT
Start: 2023-06-05

## 2023-06-05 NOTE — PROGRESS NOTES
"[facility]  Obstetrics  Date of Service: 2023    CC: Postpartum visit      Hannah Buchanan is a 28 y.o.  s/p Vaginal, Spontaneous on 2023 at 1048 secondary to spontaneous labor who presents today for postpartum check.  The patient states she is doing well. Patient denies postpartum depression.  Menstrual cycles have not resumed.  She is breast feeding.  She has resumed sexual intercourse without difficulty.  Denies bowel or bladder issues.    PHYSICAL EXAM:    Ht 175.3 cm (69\")   Wt 106 kg (234 lb)   LMP 2022   Breastfeeding Yes   BMI 34.56 kg/m²   Breast Exam: Lactating, no masses, skin dimpling, nipple retraction, or nipple discharge bilaterally.  Abdomen: +BS, benign, no masses, soft, non-tender.  Bimanual exam: Deferred  Extremities: No deep calf tenderness.  Postpartum Depression Screening Questionnaire: 0    IMPRESSION/PLAN: Hannah Buchanan is a 28 y.o.  s/p Vaginal, Spontaneous on 2023.  Doing well.  - Recovered nicely from her delivery  - Breastfeeding   - Contraception: Micronor 0.35mg tablets. RBA and potential s/e of medication reviewed. Pt agrees to plan and v/u.  - Restrictions lifted          This document has been electronically signed by DELIA Ray on 2023 14:01 CDT      "

## 2023-06-19 ENCOUNTER — TELEPHONE (OUTPATIENT)
Dept: OBSTETRICS AND GYNECOLOGY | Facility: CLINIC | Age: 28
End: 2023-06-19

## 2023-06-19 RX ORDER — CLOBETASOL PROPIONATE 0.5 MG/G
1 OINTMENT TOPICAL 2 TIMES DAILY
Qty: 1 EACH | Refills: 1 | Status: SHIPPED | OUTPATIENT
Start: 2023-06-19

## 2023-06-19 NOTE — TELEPHONE ENCOUNTER
----- Message from DELIA Sepulveda sent at 6/19/2023 12:44 PM CDT -----  Contact: [phone]  If she has a working number, you can let her know I sent in refills.   ----- Message -----  From: Tessa Stark MA  Sent: 6/19/2023  10:51 AM CDT  To: DELIA Sepulveda      ----- Message -----  From: Nichol Lo CSA  Sent: 6/19/2023  10:17 AM CDT  To: Tessa Stark MA    Would like a script sent in for clobetasol ointment sent in to [facility] in [facility].

## 2023-08-01 ENCOUNTER — TELEPHONE (OUTPATIENT)
Dept: OBSTETRICS AND GYNECOLOGY | Facility: CLINIC | Age: 28
End: 2023-08-01